# Patient Record
Sex: FEMALE | Race: WHITE | NOT HISPANIC OR LATINO | Employment: OTHER | URBAN - METROPOLITAN AREA
[De-identification: names, ages, dates, MRNs, and addresses within clinical notes are randomized per-mention and may not be internally consistent; named-entity substitution may affect disease eponyms.]

---

## 2018-07-27 ENCOUNTER — OFFICE VISIT - RIVER FALLS (OUTPATIENT)
Dept: FAMILY MEDICINE | Facility: CLINIC | Age: 63
End: 2018-07-27

## 2018-07-27 ASSESSMENT — MIFFLIN-ST. JEOR: SCORE: 1312.92

## 2018-07-28 LAB
CHOLEST SERPL-MCNC: 210 MG/DL
CHOLEST/HDLC SERPL: 3.3 {RATIO}
CREAT SERPL-MCNC: 0.7 MG/DL (ref 0.5–0.99)
GLUCOSE BLD-MCNC: 84 MG/DL (ref 65–99)
HDLC SERPL-MCNC: 64 MG/DL
LDLC SERPL CALC-MCNC: 114 MG/DL
NONHDLC SERPL-MCNC: 146 MG/DL
TRIGL SERPL-MCNC: 208 MG/DL

## 2018-08-01 ENCOUNTER — TRANSFERRED RECORDS (OUTPATIENT)
Dept: HEALTH INFORMATION MANAGEMENT | Facility: CLINIC | Age: 63
End: 2018-08-01

## 2018-08-01 LAB — HPV ABSTRACT: NORMAL

## 2018-08-16 ENCOUNTER — COMMUNICATION - RIVER FALLS (OUTPATIENT)
Dept: FAMILY MEDICINE | Facility: CLINIC | Age: 63
End: 2018-08-16

## 2018-08-17 ENCOUNTER — AMBULATORY - RIVER FALLS (OUTPATIENT)
Dept: FAMILY MEDICINE | Facility: CLINIC | Age: 63
End: 2018-08-17

## 2018-08-20 ENCOUNTER — AMBULATORY - RIVER FALLS (OUTPATIENT)
Dept: FAMILY MEDICINE | Facility: CLINIC | Age: 63
End: 2018-08-20

## 2018-08-27 ENCOUNTER — AMBULATORY - RIVER FALLS (OUTPATIENT)
Dept: FAMILY MEDICINE | Facility: CLINIC | Age: 63
End: 2018-08-27

## 2018-08-29 ENCOUNTER — AMBULATORY - RIVER FALLS (OUTPATIENT)
Dept: FAMILY MEDICINE | Facility: CLINIC | Age: 63
End: 2018-08-29

## 2018-09-17 ENCOUNTER — AMBULATORY - RIVER FALLS (OUTPATIENT)
Dept: FAMILY MEDICINE | Facility: CLINIC | Age: 63
End: 2018-09-17

## 2019-01-16 ENCOUNTER — OFFICE VISIT - RIVER FALLS (OUTPATIENT)
Dept: FAMILY MEDICINE | Facility: CLINIC | Age: 64
End: 2019-01-16

## 2019-04-11 ENCOUNTER — AMBULATORY - RIVER FALLS (OUTPATIENT)
Dept: FAMILY MEDICINE | Facility: CLINIC | Age: 64
End: 2019-04-11

## 2019-05-03 ENCOUNTER — COMMUNICATION - RIVER FALLS (OUTPATIENT)
Dept: FAMILY MEDICINE | Facility: CLINIC | Age: 64
End: 2019-05-03

## 2019-09-03 ENCOUNTER — COMMUNICATION - RIVER FALLS (OUTPATIENT)
Dept: FAMILY MEDICINE | Facility: CLINIC | Age: 64
End: 2019-09-03

## 2019-09-18 ENCOUNTER — AMBULATORY - RIVER FALLS (OUTPATIENT)
Dept: FAMILY MEDICINE | Facility: CLINIC | Age: 64
End: 2019-09-18

## 2019-12-13 ENCOUNTER — AMBULATORY - RIVER FALLS (OUTPATIENT)
Dept: FAMILY MEDICINE | Facility: CLINIC | Age: 64
End: 2019-12-13

## 2020-02-18 ENCOUNTER — AMBULATORY - RIVER FALLS (OUTPATIENT)
Dept: FAMILY MEDICINE | Facility: CLINIC | Age: 65
End: 2020-02-18

## 2020-05-08 ENCOUNTER — COMMUNICATION - RIVER FALLS (OUTPATIENT)
Dept: FAMILY MEDICINE | Facility: CLINIC | Age: 65
End: 2020-05-08

## 2020-09-28 ENCOUNTER — COMMUNICATION - RIVER FALLS (OUTPATIENT)
Dept: FAMILY MEDICINE | Facility: CLINIC | Age: 65
End: 2020-09-28

## 2020-10-29 ENCOUNTER — COMMUNICATION - RIVER FALLS (OUTPATIENT)
Dept: FAMILY MEDICINE | Facility: CLINIC | Age: 65
End: 2020-10-29

## 2020-12-13 ENCOUNTER — COMMUNICATION - RIVER FALLS (OUTPATIENT)
Dept: FAMILY MEDICINE | Facility: CLINIC | Age: 65
End: 2020-12-13

## 2021-02-05 ENCOUNTER — AMBULATORY - RIVER FALLS (OUTPATIENT)
Dept: FAMILY MEDICINE | Facility: CLINIC | Age: 66
End: 2021-02-05

## 2021-03-04 ENCOUNTER — COMMUNICATION - RIVER FALLS (OUTPATIENT)
Dept: FAMILY MEDICINE | Facility: CLINIC | Age: 66
End: 2021-03-04

## 2021-03-09 ENCOUNTER — OFFICE VISIT - RIVER FALLS (OUTPATIENT)
Dept: FAMILY MEDICINE | Facility: CLINIC | Age: 66
End: 2021-03-09

## 2021-03-09 ASSESSMENT — MIFFLIN-ST. JEOR: SCORE: 1361

## 2021-03-23 ENCOUNTER — COMMUNICATION - RIVER FALLS (OUTPATIENT)
Dept: FAMILY MEDICINE | Facility: CLINIC | Age: 66
End: 2021-03-23

## 2021-05-05 ENCOUNTER — COMMUNICATION - RIVER FALLS (OUTPATIENT)
Dept: FAMILY MEDICINE | Facility: CLINIC | Age: 66
End: 2021-05-05

## 2021-06-15 ENCOUNTER — RECORDS - HEALTHEAST (OUTPATIENT)
Dept: ADMINISTRATIVE | Facility: OTHER | Age: 66
End: 2021-06-15

## 2021-06-15 ENCOUNTER — RECORDS - HEALTHEAST (OUTPATIENT)
Dept: CARDIOLOGY | Facility: CLINIC | Age: 66
End: 2021-06-15

## 2021-06-16 ENCOUNTER — OFFICE VISIT - HEALTHEAST (OUTPATIENT)
Dept: CARDIOLOGY | Facility: TELEHEALTH | Age: 66
End: 2021-06-16

## 2021-06-16 ENCOUNTER — AMBULATORY - RIVER FALLS (OUTPATIENT)
Dept: FAMILY MEDICINE | Facility: CLINIC | Age: 66
End: 2021-06-16

## 2021-06-16 DIAGNOSIS — R00.0 TACHYCARDIA: ICD-10-CM

## 2021-06-17 ENCOUNTER — AMBULATORY - HEALTHEAST (OUTPATIENT)
Dept: CARDIOLOGY | Facility: CLINIC | Age: 66
End: 2021-06-17

## 2021-06-17 ENCOUNTER — RECORDS - HEALTHEAST (OUTPATIENT)
Dept: ADMINISTRATIVE | Facility: OTHER | Age: 66
End: 2021-06-17

## 2021-06-17 LAB
BUN SERPL-MCNC: 25 MG/DL (ref 7–25)
BUN/CREAT RATIO - HISTORICAL: NORMAL (ref 6–22)
CALCIUM SERPL-MCNC: 9.5 MG/DL (ref 8.6–10.4)
CHLORIDE BLD-SCNC: 109 MMOL/L (ref 98–110)
CO2 SERPL-SCNC: 23 MMOL/L (ref 20–32)
CREAT SERPL-MCNC: 0.8 MG/DL (ref 0.5–0.99)
EGFRCR SERPLBLD CKD-EPI 2021: 78 ML/MIN/1.73M2
GLUCOSE BLD-MCNC: 94 MG/DL (ref 65–99)
POTASSIUM BLD-SCNC: 4.2 MMOL/L (ref 3.5–5.3)
SODIUM SERPL-SCNC: 144 MMOL/L (ref 135–146)
TSH SERPL DL<=0.005 MIU/L-ACNC: 1.98 MIU/L (ref 0.4–4.5)

## 2021-06-18 ENCOUNTER — COMMUNICATION - HEALTHEAST (OUTPATIENT)
Dept: CARDIOLOGY | Facility: CLINIC | Age: 66
End: 2021-06-18

## 2021-06-26 NOTE — TELEPHONE ENCOUNTER
----- Message from Feliciano Bee MD sent at 6/17/2021  1:21 PM CDT -----  BMP and TSH normal  ----- Message -----  From: Irais Reyez  Sent: 6/17/2021  12:38 PM CDT  To: Feliciano Bee MD      === Message left for pt stating normal results and to call [phone] with any questions.  -[facility]

## 2021-07-04 NOTE — PROGRESS NOTES
Progress Notes by Feliciano Bee MD at 6/16/2021  3:30 PM     Author: Feliciano Bee MD Service: -- Author Type: Physician    Filed: 6/16/2021  4:29 PM Encounter Date: 6/16/2021 Status: Signed    : Feliciano Bee MD (Physician)             Assessment/Recommendations   Patient with at least 3 episodes of abrupt onset of tachycardia of around 160 bpm.  She does not get dramatic symptoms with this but certainly can feel her heart fluttering and on one occasion had to urinate frequently which suggest atrial dysrhythmia and release of atrial natruretic peptide.  My suspicion is she has supraventricular tachycardia or AV georgina reentry tachycardia.  Her twelve-lead EKG does not show evidence of an accessory pathway.  I cannot not exclude the possibility of atrial fibrillation which is paroxysmal but full duration is around 20 minutes and Valsalva seem to bring the rhythm disturbance on the last episode which should be inconsistent with atrial fibrillation and consistent with AV georgina reentry tachycardia.    I have recommended that we check a TSH, electrolytes today.  I recommend an echocardiogram to check the structural integrity of her heart.  We talked about the possibility of a calcium scan given her family history of coronary artery disease.  She will give that some thought.    If she gets further episodes she will try Valsalva if she is near a medical facility or working as a volunteer with the paramedic she will get a EKG rapidly so we can document the rhythm disturbance.  We talked about the possibility of a Lynx is implantable device which she can have her 2 or 3 years but the symptoms are not very dramatic and neither of us thought that that would be prudent at this point, particularly if she can break this with Valsalva consistently.    Thank you for allowing us to participate in her care.       History of Present Illness/Subjective    Ms. Danay Galdamez is a 65 y.o. female with no known heart  patient has had episodes of, about 3 or 4 in the last 2 years of abrupt onset of a fast heartbeat.  She can count the heart rate and wore a watch type monitor and her heart rate was up to 164 bpm and one episode and the monitor did not tell her what the rhythm was and said the heart rate was too fast.  She feels her heart going fast and on one occasion had to urinate frequently during this episode.  Episodes all of lasted about 20 minutes and then abruptly go back to normal as fast as they came on.  She does not get syncopal or near syncopal and does not get short of breath or chest discomfort with with these episodes.  She does not drink alcohol and cut way back on her caffeine although it does not does still drink some caffeine.  She denies any history of orthopnea, paroxysmal nocturnal dyspnea, peripheral edema syncope, near syncope and has no history rheumatic fever, cerebrovascular accident or TIA.  She has never had a heart murmur.  She has never smoked cigarettes, is not diabetic, has not been treated for hypertension and reports that her cholesterol is okay.  Her father had bypass surgery around age 68.  She has a brother who had A. fib and another brother who had bypass surgery.    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  She is  and has 2 children a son and a daughter.  She has a new grandchild and they met this new grandchild today.  She is .  Her  joined her for the visit today.        ECG: Personally reviewed.  EKG done on February 5, 2021 shows sinus rhythm at 61 bpm and is normal       Physical Examination Review of Systems   Vitals:    06/16/21 1527   BP: 118/74   Pulse: 72   SpO2: 97%     There is no height or weight on file to calculate BMI.  Wt Readings from Last 3 Encounters:   06/16/21 168 lb (76.2 kg)     General Appearance:   Alert, cooperative and in no acute distress.   ENT/Mouth: Patient wearing a mask.      EYES:  no scleral  icterus, normal conjunctivae   Neck: JVP normal. No Hepatojugular reflux. Thyroid not visualized.   Chest/Lungs:   Lungs are clear to auscultation, equal chest wall expansion.   Cardiovascular:   S1, S2 without murmur ,clicks or rubs. Brachial, radial and posterior tibial pulses are intact and symetric. No carotid bruits noted   Abdomen:  Nontender. BS+.    Extremities: No cyanosis, clubbing or edema   Skin: no xanthelasma, warm.    Neurologic: normal arm movement bilateral, no tremors     Psychiatric: Appropriate affect.      General: WNL  Eyes: WNL  Ears/Nose/Throat: WNL  Lungs: WNL  Heart: (Palpitations)  Stomach: WNL  Bladder: WNL  Muscle/Joints: WNL  Skin: WNL  Nervous System: WNL  Mental Health: WNL     Blood: WNL       Medical History  Surgical History Family History Social History   No past medical history on file. No past surgical history on file. No family history on file. Social History     Socioeconomic History   ? Marital status:      Spouse name: Not on file   ? Number of children: Not on file   ? Years of education: Not on file   ? Highest education level: Not on file   Occupational History   ? Not on file   Social Needs   ? Financial resource strain: Not on file   ? Food insecurity     Worry: Not on file     Inability: Not on file   ? Transportation needs     Medical: Not on file     Non-medical: Not on file   Tobacco Use   ? Smoking status: Never Smoker   ? Smokeless tobacco: Never Used   Substance and Sexual Activity   ? Alcohol use: Not on file   ? Drug use: Not on file   ? Sexual activity: Not on file   Lifestyle   ? Physical activity     Days per week: Not on file     Minutes per session: Not on file   ? Stress: Not on file   Relationships   ? Social connections     Talks on phone: Not on file     Gets together: Not on file     Attends Worship service: Not on file     Active member of club or organization: Not on file     Attends meetings of clubs or organizations: Not on file      Relationship status: Not on file   ? Intimate partner violence     Fear of current or ex partner: Not on file     Emotionally abused: Not on file     Physically abused: Not on file     Forced sexual activity: Not on file   Other Topics Concern   ? Not on file   Social History Narrative   ? Not on file          Medications  Allergies   No current outpatient medications on file.     No current facility-administered medications for this visit.     No Known Allergies      Lab Results    Chemistry/lipid CBC Cardiac Enzymes/BNP/TSH/INR   No results found for: CHOL, HDL, LDLCALC, TRIG, CREATININE, BUN, K, NA, CL, CO2 No results found for: WBC, HGB, HCT, MCV, PLT No results found for: CKTOTAL, CKMB, CKMBINDEX, TROPONINI, BNP, TSH, INR

## 2021-07-05 PROBLEM — R00.0 TACHYCARDIA: Status: ACTIVE | Noted: 2021-06-16

## 2021-07-06 VITALS
OXYGEN SATURATION: 97 % | SYSTOLIC BLOOD PRESSURE: 118 MMHG | HEART RATE: 72 BPM | DIASTOLIC BLOOD PRESSURE: 74 MMHG | WEIGHT: 168 LBS

## 2021-07-07 ENCOUNTER — COMMUNICATION - RIVER FALLS (OUTPATIENT)
Dept: FAMILY MEDICINE | Facility: CLINIC | Age: 66
End: 2021-07-07

## 2021-09-07 ENCOUNTER — OFFICE VISIT - RIVER FALLS (OUTPATIENT)
Dept: FAMILY MEDICINE | Facility: CLINIC | Age: 66
End: 2021-09-07

## 2021-09-07 ASSESSMENT — MIFFLIN-ST. JEOR: SCORE: 1361

## 2022-02-11 VITALS
HEIGHT: 67 IN | HEART RATE: 78 BPM | SYSTOLIC BLOOD PRESSURE: 112 MMHG | TEMPERATURE: 97 F | WEIGHT: 161 LBS | BODY MASS INDEX: 25.27 KG/M2 | DIASTOLIC BLOOD PRESSURE: 68 MMHG

## 2022-02-11 VITALS
WEIGHT: 171.6 LBS | SYSTOLIC BLOOD PRESSURE: 116 MMHG | HEIGHT: 67 IN | HEART RATE: 60 BPM | TEMPERATURE: 97.6 F | DIASTOLIC BLOOD PRESSURE: 66 MMHG | BODY MASS INDEX: 26.93 KG/M2

## 2022-02-11 VITALS
WEIGHT: 171.6 LBS | HEIGHT: 67 IN | HEART RATE: 73 BPM | BODY MASS INDEX: 26.93 KG/M2 | DIASTOLIC BLOOD PRESSURE: 82 MMHG | SYSTOLIC BLOOD PRESSURE: 122 MMHG

## 2022-02-16 NOTE — TELEPHONE ENCOUNTER
Order, notes and insurance card are faxed to [facility] and they will contact patient and schedule.

## 2022-02-16 NOTE — PROGRESS NOTES
Patient:   TESHA SHANE            MRN: [id number]            FIN: [id number]               Age:   65 years     Sex:  Female     :  1955   Associated Diagnoses:   None   Author:   Ger WALDRON, Wilman      Procedure   EKG procedure   Patient sent in incomplete EKG from outside provider.  EKG read as normal.

## 2022-02-16 NOTE — TELEPHONE ENCOUNTER
---------------------  From: Ary Gaytan RN (Phone Messages Pool ([id number]_[place]))   To: Appointment Pool ([id number]_[place]);     Sent: 3/4/2021 6:18:31 PM CST  Subject: Consult     Please call the patient and schedule a consult for colonoscopy change in bowel per CHT.scheduled

## 2022-02-16 NOTE — TELEPHONE ENCOUNTER
---------------------  From: Charu Goldsmith CMA (Phone Messages Pool ([id number]_[place]))   To: Wilman Cyr MD;     Sent: 1/29/2019 10:33:55 AM CST  Subject: FW: to Dr. Cyr           ---------------------  From: DANAY SHANE  To: [facility] [place]  Sent: 01/29/2019 09:53 a.m. CST  Subject: to Stefan Nevarez, thanks very much.  I m happy to have normal bone density for a young person at age 63--as you can imagine.  I appreciate your willingness to vouch for me as a reference and that you have kim that I can be useful to [facility], whether [place] or .  Or, I guess, [place], if nothing else pans out.    Anyway, thanks very much.  Danay

## 2022-02-16 NOTE — TELEPHONE ENCOUNTER
---------------------  From: Kim Treviño CMA (Phone Messages Pool ([id number]_[facility]))   To: Wilman Cyr MD;     Sent: 7/7/2021 8:12:18 AM CDT  Subject: FW: additional to Júnior Cyr           ---------------------  From: DANAY SHANE  To: [facility]  Sent: 07/06/2021 07:18 p.m. CDT  Subject: additional to Júnior Cyr  We are only down here in town, with internet and cell coverage, till tomorrow, so a lema response would be so appreciated.  My phone number is [phone] if you would want to call.  Danay

## 2022-02-16 NOTE — TELEPHONE ENCOUNTER
---------------------  From: Vickie hWitmore CMA (Phone Messages Pool ([id number]_[facility]))   To: [facility] Message Pool ([id number]_[facility]);     Sent: 3/23/2021 3:28:24 PM CDT  Subject: FW: Stefan Cyr--fit test anemia           ---------------------  From: TESHA SHANE  To: [facility]  Sent: 03/23/2021 03:15 p.m. CDT  Subject: Stefan Cyr--fit test anemia  Stefan Delong, thanks for your lema reply.  Do I just go in and ask for a test for anemia?  Not a bad idea.  I used to give blood, before they cut me off due to my having been in [place] during [place] times, and I was sometimes sent home because I had anemia.    Thanks, M---------------------  From: Alia Randolph CMA ([facility] Message Pool ([id number]_[facility]))   To: Phone Messages Pool ([id number]_[facility]); TESHA SHANE    Sent: 3/23/2021 3:52:10 PM CDT  Subject: RE: Stefan Cyr--fit test anemia     Catherine Jon,    An order for the Hemoglobin has been placed, so all you need to do is request a lab only appointment through your portal or call [phone] to schedule.     Warm regards,  Alia WIGGINS CMA

## 2022-02-16 NOTE — TELEPHONE ENCOUNTER
---------------------  From: Sugey William RN (Phone Messages Pool ([id number]_[facility]))   To: [facility] Message Pool ([id number]_[facility]);     Sent: 9/28/2020 4:56:38 PM CDT  Subject: FW: heart rhythm           ---------------------  From: TESHA SHANE  To: [facility]  Sent: 09/27/2020 01:23 p.m. CDT  Subject: heart rhythm  Dear Stefan Cyr,  I  hope you and family are well.  Sorry to bother you with this, but here goes:  A year ago, and I may have written you about this, [place].  I ate a wild plum, as I am wont to do and have for decades, and then, later, down at the house, I experienced a racing heartbeat.  I took a Benadryl tablet and we started to drive to [facility], but by [place] I was fine and we came home.  End of story.    Now I am in [place].  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  In APril Tru and I will move out there and live till October.  The nearest town is [place], two hours away on rough, [place], curvy [place] roads.    Tru and I were invited to spend a few days there to experience what it is going to be like, so we drove out and were there for a few days.  I took a hike down to the river and back.  It is a steep climb up.  I was talking with the current writer in residence and doing fine, we were going slowly, and then I felt something like a fish flop in my chest.  I went up to the cabin and sat down and felt my heart racing.  I couldn t make it slow down.    I went out to the car and reclined the seat and tried deep coughs, straining as if for a bowel movement, etc.  My throat felt constricted and my neck muscles were sore, but that could be from my backpack I had been wearing.  The throat thing ended fairly soon, but I could not get the racing heart beat down below 134, which is twice what it normally would be after a long hike and then a rest.    Eventually, after about  an hour or so, it slowed to a regular beat.    I thought about the causes and here is what I think:  I was not drinking much at all.  I  normally get a good bit of exercise walking to the bathroom at night to pee, but I was only having to get up once the nights I was there.  I just don t drink a lot of liquids, and I was drinking less than usual while there.  Also, I had two mugs of high-powered coffee in the morning.  It was delicious, but I normally have a couple of mugs of a less-amped coffee, half de-caf and half regular, in the morning.    Once we were back in range of cell service (there is no phone or wifi at this place) I looked up dehydration and heart rhythm and read that being dehydrated can lead to low blood volume and thus the heart has to race to keep things up, as in hypovolemic shock.    I can t remember if I was dehydrated for the first incident, the [place] Incident, as I shall call it.  But I know I was for this one.    Tru is understandably concerned about our being so far from services and me with unanswered questions about my heart.  I don t know how we can find out more, if I m only having these incidents every 15 months or so.  But if you have ideas, I am all ears.    [place] on the [place].  You and Chacha are welcome to visit, if you wish--there are two cabins, both with indoor plumbing.  No phones or internet, and it is a drive of a few miles to get to within striking distance of possible cell coverage.    Very best wishDanay owen---------------------  From: Alia Amaro ([employer] Message Pool ([id number]_[facility]))   To: Wilman Cyr MD;     Sent: 9/28/2020 5:09:40 PM CDT  Subject: FW: heart rhythm---------------------  From: Wilman Cyr MD   To: [employer] Message Pool ([id number]_[facility]); DANAY SHANE    Sent: 9/29/2020 9:41:35 AM CDT  Subject: RE: heart rhythm     Baljeet Jon,    Congratulations on your writing residency.    Dehydration is a  likely cause of your rapid heart rate.  There are two possibilities here.  If the rapid rate is irregular then there is concern for atrial fibrillation which needs to be treated.  The biggest risk is a stroke when you go into and out of atrial fibrillation.    The other fast heart rate would be regular and most likely related to low volume treated with drinking with more fluids.     We need to get a tracing during these episodes.  Some people can do it on their watches or another option would be to go to an emergency room during an episode and have them run an EKG.      A final option is to see a cardiologist and they can implant a recording device under the skin that monitors your heart beat for 2 weeks or more.    If you would like to see a cardiologist I can set it up when you are home.  We need to diagnose the actual rhythm before we can look at treatment.    Hope this helps.    Stefan Cyr MD

## 2022-02-16 NOTE — TELEPHONE ENCOUNTER
---------------------  From: Kim Treviño CMA (Phone Messages Pool ([id number]_[facility]))   To: TESHA GALDAMEZ    Sent: 10/29/2020 8:14:23 AM CDT  Subject: RE: testing for Covid     Tesha    The next step would be to call the clinic [phone] and set up a video/telephone visit with a provider to discuss and they can order the COVID test if decided.    Please let us know if you have further questions.  NIMA Washburn      ---------------------  From: TESHA GALDAMEZ  To: [facility]  Sent: 10/28/2020 07:47 p.m. CDT  Subject: testing for Covid  HI,  Our daughter is home from [place].  She doesn t think she has been in contact with anyone who has Covid, but she is experiencing a runny nose, headache, and slight sore throat.  Should she get a Covid test?  She will be leaving for [place] in a week.    And if she gets one, I feel as if I should get one, as well, because I ve been around her so much.    What is the next step?  THanks, Tesha Galdamez

## 2022-02-16 NOTE — TELEPHONE ENCOUNTER
---------------------  From: Charu Goldsmith CMA (Phone Messages Pool ([id number]_[place]))   To: Wilman Cyr MD;     Sent: 9/3/2019 2:29:38 PM CDT  Subject: FW: Danayjohn Galdamez to Stefan Cyr           ---------------------  From: DANAY GALDAMEZ  To: [facility]  Sent: 09/03/2019 02:23 p.m. CDT  Subject: Danay Galdamez to Stefan Cyr  Hi Stefan, I very nearly came to see you today.  I was out walking with a friend and picked a ripe plum off a bush and ate it, spitting out the stone and the skin.  We got back to the house and he left.  I went in and felt as if my heart were racing.  I got out my stethoscope and was astonished to find it beating at about 140+ bpm.  I could not get a BP on me probably because I am so inept.  My throat began to feel as it does when one is trying not to cry--sort of sore and close.  The friend with whom I d been walking is a paramedic for [place], [employer], so I texted him to ask his advice.  He suggested an allergic reaction to the plum and thought maybe taking a Benadryl would help.  I tried resting, but  the rate would not decrease, and I was feeling not good.    I took a Benadryl and R started driving me to [place].  By the time we got to [place], I was feeling much, much better.  I got out of the truck and did some in-place running to see what it would do to my heart.  All seemed fine, so we came home.    I m relating all this to you in case you have some ideas as to what could have happened.  I ve certainly had a lot of plums through the years.  It s hard to imagine that could have been it.  I m training for the [employer] on Oct 6 and will be running 20 miles on Thursday as part of the training.    I hope all is well with you.  Thanks, Danay---------------------  From: Ger WALDRON, Matthews   To: Phone Messages Pool ([id number]_[place]); DANAY GALDAMEZ    Sent: 9/3/2019 4:13:42 PM CDT  Subject: RE: Danay Galdamez to Stefan  Ger Jon,    I think it was an allergy.  It's possible it's something you inhaled or the plum.  I would take a loratadine (generic Claritin)  10mg daily for the next couple days.  It will last longer than the Benadryl and have fewer side effects.  It is available over the counter.    Good luck with the [place]!    Stefan Cyr MD

## 2022-02-16 NOTE — TELEPHONE ENCOUNTER
Order faxed to [facility], they will contact patient to schedule. LM for patient to call me for update.

## 2022-02-16 NOTE — TELEPHONE ENCOUNTER
---------------------  From: Alia Randolph CMA ([employer] Message Pool ([id number]_[facility]))   To: Wilman Cyr MD;     Sent: 5/6/2021 7:51:04 AM CDT  Subject: FW: heart question           ---------------------  From: Jackson/Eliana BEAVER (Phone Messages Pool ([id number]_[facility]))   To: [facility] Message Pool ([id number]_[facility]);     Sent: 5/5/2021 1:07:21 PM CDT  Subject: FW: heart question           ---------------------  From: TEHSA SHANE  To: [facility]  Sent: 05/05/2021 12:46 p.m. CDT  Subject: heart question  Stefan KING,  You might remember that I have had a couple of heart-racing episodes.  I wrote to you earlier about it, I went in and had an EKG that was fine, and we decided the situation must have happened because I d let myself get dehydrated.  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  Today I went for a hike by myself (usually [place]) and after a tramp on the [place] I climbed down a steep slope and sat by the beautiful river really, this is such a beautiful place!!!  I had a granola bar and some water and then decided to climb back up.  I first had to get off the rock I was on, and I did that, and suddenly I felt my heart turn over and go into overdrive.  I have one of those smart watches and it does an EKG.  I sat down and tried to get it to work.  But the results were inconclusive because, it said, my heart rate was 164 and too fast for it to analyze.  I sat and tried to quiet it.  I finished off my water in the bottle, in case it was dehydration causing the problem.  I tried another EKG with the same results.  I could not get that heart to settle.  Crazy, because I have been getting great exercise here.  Every day I hike down to the river, which is 800 vertical feet from the cabin.  There s a trail of switchbacks that goes down to it, taking   mile to  get down to the river.  I go down, savor the river, and then hike back up and I really work myself, arriving at the top with a heart rate of usually 140, which then settles back to under 100 easily within a short time.  My resting heart rate is 55.  Most days I log about 20,000 steps by end of day.  So, back to the racing heart.  I did that maneuver in which you squeeze hard, as if you are giving birth, and I held that HARD for a while, and then I finally relaxed and took in some air, I was fine.  Heart rate down to less than 100.  I tried the EKG and it said normal sinus rhythm.  I then walked home slowly, taking my time.  I had to climb back up to the cabin, which I did slowly, and I have been fine ever since.  But both Tru and I are concerned and wonder what to make of this.  I don t think my heart itself is the problem.  One of my brothers had to have an ablation; maybe I need that?  I really don t know.  I m composing this in the evening in the cabin.  We ll go to town the day after tomorrow and I ll paste it into the email part of the [employer] web site and send it as soon as we can get connected.  Then we ll be in town for a few hours then we will go back to isolation.  We do have a radio phone here, and if you feel you need to reach me, the number is [phone].  You have to dial the 1 first.  It takes a few rings before we get a ring here in the cabin, so give it time.  BUT that kind of urgency is probably not necessary.  We will be back in coverage on May 11, so I could get your response then.  We will be coming back in June, I believe, to see Lucien s new baby.  It is due on the 27th of May.  We won t rush back for the earliest days of its life (though of course I want to).  If I could get an appointment with someone who is a heart doctor, if you think that is houser, I could see that person then.  I know it is tough to get an appt with a specialist, so I am prepared for that to be maybe not  possible.    Tesha Galdamez---------------------  From: Wilman Cyr MD   To: [facility] Message Pool ([id number]_[facility][note 2]); TESHA GALDAMEZ    Sent: 5/6/2021 8:46:48 AM CDT  Subject: RE: heart question     Baljeet Jon,    This most likely is PSVT which is not Afib.  The trick is to get an EKG while it is happening.  One cannot rule out V Tach which is unstable and an emergency without an EKG during the episode.  I will work on getting you a cardiology referral.    If it happens again I would try to go in.    Stefan Cyr MD  ** Submitted: **  Order:Referral (Request)  Details:  5/6/2021 8:47 AM CDT, Referred to: Cardiology, Referred to: [facility], Reason for referral: Please see message on when she will be in town.  Probable diagnosis PSVT., Tachycardia         Signed by Wilman Cyr MD  5/6/2021 1:47:00 PM [facility]

## 2022-02-16 NOTE — LETTER
(Inserted Image. Unable to display)   July 29, 2019      TESHA SHANE   [address] [place], [place] [id number]        Dear TESHA,      Thank you for selecting [facility] ([facility]viously [facility]) for your healthcare needs.     Our records indicate you are due for the following services:     Annual Physical    To schedule an appointment or if you have further questions, please contact your primary clinic:   [facility]          [phone]   [facility]    [phone]             [facility]         [phone]      Powered by [employer]    Sincerely,    Wilman Cyr M.D.

## 2022-02-16 NOTE — PROGRESS NOTES
Patient:   TESHA SHANE            MRN: [id number]            FIN: [id number]               Age:   65 years     Sex:  Female     :  1955   Associated Diagnoses:   None   Author:   Wilman Cyr MD      Procedure   EKG procedure   Date:  2021.     Confirmed: patient.     Performed by: nurse.     Indication: palpitations.     EKG findings   Interpretation: Wilman yCr MD.     Rhythm: heart rate  61  beats/min.     Axis: normal axis, normal configuration.     P waves: normal.     QRS complex: normal.     ST-T-U complex: normal, isoelectric ST segment, prolonged QT interval.     Interpretation: normal EKG.

## 2022-02-16 NOTE — PROGRESS NOTES
Patient:   TESHA SHANE            MRN: [id number]            FIN: [id number]               Age:   65 years     Sex:  Female     :  1955   Associated Diagnoses:   Positive FIT (fecal immunochemical test)   Author:   Justo Martínez MD      Visit Information      Date of Service: 2021 02:32 pm  Performing Location: [facility]  Encounter#: [id number]      Primary Care Provider (PCP):  Wilman Cyr MD    NPI# [id number]      Referring Provider:  Justo Martínez MD    NPI# [id number]      Chief Complaint   3/9/2021 2:49 PM CST     Colonoscopy consult        History of Present Illness   [place] in [place] 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    Positive FIT .  Vegetarian since college. Has always had good bowel movement in the morning. 2020 started having loose and more frequent stools. Did not feel as good as normal. Had a little intermittent discomfort in the left upper abdomen. Decided to give up coffee one week ago and all symptoms improved in two days. Had been drinking 4 cups a day. Denies blood in stools.    No alcohol. Nonsmoker. No soda. Ibuprofen seldom for headache.      Review of Systems   Constitutional:  No fever.    Respiratory:  No shortness of breath.    Cardiovascular:  No chest pain.    Gastrointestinal:  No vomiting, No diarrhea, No constipation.    Hematology/Lymphatics:  No bruising tendency, No bleeding tendency.    All other systems reviewed and negative     No history of bleeding disorders or blood transfusion  No prior problems with anesthesia  No family history of anesthesia problems  No positive responses for sleep apnea  Denies plavix, coumadin  Denies history of DVT/PE  Denies recent corticosteroid use    Able to walk a flight of stairs without chest pain or shortness of breath.      Health Status   Allergies:    Allergic Reactions (Selected)  No known allergies   Medications:  (Selected)      Problem list:    All  Problems  Resolved: Childbirth / SNOMED CT 9088953112  Resolved: Pregnancy / SNOMED CT 219224155  Resolved: Pregnancy / SNOMED CT 120153819      Histories   Procedure history:    Tubal ligation (SNOMED CT 284488755) on 1990 at 34 Years.   Social History:  (Tru Savage). 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 Has two children.  Family History: Mom  80 from Rheumatoid arthritis. Dad  75 from Liver cancer. One sister and five brothers (one with Lymphoma)      Physical Examination   Vital Signs   3/9/2021 2:49 PM CST Temperature Tympanic 97.6 DegF  LOW    Peripheral Pulse Rate 60 bpm    Pulse Site Radial artery    HR Method Manual    Systolic Blood Pressure 116 mmHg    Diastolic Blood Pressure 66 mmHg    Mean Arterial Pressure 83 mmHg    BP Site Right arm    BP Method Manual      Measurements from flowsheet : Measurements   3/9/2021 2:49 PM CST Height Measured - Standard 67 in    Weight Measured - Standard 171.6 lb    BSA 1.92 m2    Body Mass Index 26.87 kg/m2  HI      General:  Alert and oriented, No acute distress.    HENT:  No pharyngeal erythema.    Neck:  No lymphadenopathy.    Respiratory:  Lungs are clear to auscultation.    Cardiovascular:  Normal rate, Regular rhythm.    Gastrointestinal:  Soft, Non-tender, Non-distended.    Musculoskeletal:  Normal gait.    Neurologic:  No focal deficits.    Psychiatric:  Appropriate mood & affect.       Impression and Plan   Diagnosis     Positive FIT (fecal immunochemical test) (DBN11-HX R19.5).     Discussed risks and benefits of colonoscopy.

## 2022-02-16 NOTE — TELEPHONE ENCOUNTER
---------------------  From: Kelsi Villar MA (Phone Messages Pool ([id number]_[facility]))   To: Wilman Cyr MD;     Sent: 1/28/2019 10:12:15 AM CST  Subject: CONSUEMER MESSAGE: For Dr. Cyr           ---------------------  From: DANAY SHANE  To: [facility]  Sent: 01/28/2019 10:07 a.m. CST  Subject: For Dr. 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  May I use you as a reference?  If so, I need an email and phone number at which they can reach you.    1.  Please let me know if you are ok with being my reference.    2.  If you are ok with that, please send me the email and phone number you want them to have.    Thanks very much!  Danay---------------------  From: Wilman Cyr MD   To: Phone Messages Pool ([id number]_[facility]); DANAY SHANE    Sent: 1/29/2019 8:06:58 AM CST  Subject: RE: CONSUEMER MESSAGE: For Dr. Ger Jon,    I would be delighted.  Please have them use [phone] [facility]) and [email or web address].    Good luck, they will be luck to have you.    Stefan

## 2022-02-16 NOTE — NURSING NOTE
Phone Message    PCP:   [facility]      Time of Call:  1:40 pm    Phone number:  n/a    Returned call at: n/a    Note:   Pt called stating that she went for a walk and ate a plum about a 1/2 hour ago. Since then she has had an increased heart rate and a weird feeling in her throat. Wondering if there was an allergen on the plum that didn't agree with her as she has had plums before. States that she is SOB but no chest pain. Was talking clearly and normal. States that her heart rate went to 140 bpm and has come down since then. Declines wanting to go to [facility] or ER. Ok to drive living 15 minutes away. Apt made with CHT and advised to come now. Verbally told CHT and CSS that patient was coming.    Pharmacy: n/a    Last office visit and reason: 6/2018    Transferred to: mar/José Miguel Call  Time: 2:03 pm  Note:  Pt called back stating that she took a benadryl prior to leaving her home and after just a few minutes of driving here she started to feel much better. States that her heart rate is lower and her throat feels normal. They decided to turn around and go home. Will continue to monitor at home and call if needed.

## 2022-02-16 NOTE — TELEPHONE ENCOUNTER
---------------------  From: Vickie Whitmore CMA (Phone Messages Pool ([id number]_[facility]))   To: [facility] Message Pool ([id number]_[facility]);     Sent: 3/23/2021 2:43:28 PM CDT  Subject: FW: Stefan Cyr--fit test           ---------------------  From: TESHA SHANE  To: [facility]  Sent: 03/23/2021 02:25 p.m. CDT  Subject: Stefan Cyr--fit test  HI, Stefan,  In August of 2018 I had a positive fit test.  Not to be too descriptive, but I do remember that morning--I had let the kit sit around for a while and thought, hey, you have to get this done.  So I set it up and had a big bowel movement, larger than normal.  Not hard, just big.  I thought the little sling to catch it would slide off, but I got what I needed and sent it in.  It was surprising to me that it came back positive, but ... that WAS a lot of poop coming through.    I decided not to have a colonoscopy and just forgot about it until this winter, when I started feeling uncomfortable on the upper left side of my abdomen and had a bit of a change in bowel habits--they are more frequent now, but a bit smaller than before.    I made an appointment to see Dr. Martínez re a colonoscopy.  That very day I gave up coffee, which I had been enjoying every day to the tune of four cups.    Within two days I felt fine.  Now I have gone back to coffee, but only on Sundays.  No adverse reactions to that.  No pain, no discomfort, no dark stools, no problems.    Dr. Martínez was really nice but seemed to think I still needed the colonoscopy.  I pointed out that because it was no longer a screening colonoscopy, due to the fit test result, it would not be covered by my insurance.  He was sympathetic to that--I know you doctors can t control the billing and pricing of things--but still thought I should have it.    I just don t see the point.  I m healthy, my innards are functioning perfectly, I feel good.  It has been two-and-a-half YEARS since that positive  fit test.  It seems as if we should start from the beginning again next time I come in to see you.    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  When I come back, how about then?  It will be more than 3 years since the first test and surely there has to be a re-set button on that.    I know you have to  caution, caution, caution.  I understand that.  But in this case, I really hope we can factor into the situation my general good health and lack of family history with colon cancer.  I have a huge family, too.  Six siblings and a myriad of cousins and aunts and uncles.  No colon cancer.  We die from heart disease, that s kind of our specialty.    Well, I look forward to your advice.  Thanks very much!  Danay---------------------  From: Alia Randolph CMA ([employer] Message Pool ([id number]_[facility]))   To: Wilman Cyr MD;     Sent: 3/23/2021 2:50:49 PM CDT  Subject: FW: Stefan Cyr--fit test---------------------  From: Wilman Cyr MD   To: [facility] Message Pool ([id number]_[facility]); DANAY SHANE    Sent: 3/23/2021 3:05:19 PM CDT  Subject: RE: Stefan Cyr--fit test     Baljeet Jon,    The answer is pretty straight forward.  The unequivocal recommendation is to proceed with the colonoscopy.  I understand the concerns with insurance and I disagree that the follow up colonoscopy is anything but screening.  The insurance companies don't care and since they make the rules you can guess that they will not cover it.  We can at least check a hemoglobin and make sure you aren't anemic  By September you will be 1 year away from medicare so that might be an option too.  Congratulations on the writing residency.    Stefan Cyr MD

## 2022-02-16 NOTE — LETTER
(Inserted Image. Unable to display)   February 13, 2020      TESHA SHANE   [address] [place], [place] [id number]        Dear TESHA,      Thank you for selecting [facility] ([facility]viously [facility]) for your healthcare needs.     Our records indicate you are due for the following services:     Immunization update    To schedule an appointment or if you have further questions, please contact your primary clinic:   [facility]          [phone]   [facility]    [phone]             [facility]         [phone]      Powered by [employer]    Sincerely,    Wilman Cyr M.D.

## 2022-02-16 NOTE — TELEPHONE ENCOUNTER
---------------------  From: Ary Gaytan RN   To: [email or web address] Message Pool ([id number]_[facility]);     Sent: 3/4/2021 6:16:15 PM CST  Subject: change bowel habit FYI     Time of Call:  1810     Person Calling:  patient  Phone number:  [phone]    Note:   Patient has had a change in bowel habits for several months. She feels she needs a colonoscopy. I let her know she should schedule a consult with one of our colonoscopist Dr. Perkins or Dr. Martínez. She agrees to do so. I let her know I would have the scheduling department call her on 3/5/21.---------------------  From: Alia Randolph CMA ([email or web address] Message Pool ([id number]_[facility]))   To: Appointment Pool ([address]_[place]);     Sent: 3/5/2021 8:34:56 AM CST  Subject: FW: change bowel habit FYIscheduled

## 2022-02-16 NOTE — PROGRESS NOTES
"Chief Complaint    c/o left knee injury over the summer--overuse.  heard \"pop\" while doing pushup a while back.  denies swelling but did have calf/foot swelling after injury.  History of Present Illness       Patient is here to discuss left knee pain that occurred over the summer.  She was in [place] most of the summer doing a lot more walking and hiking than usual.  She noted stiffness of the left knee.  She reports doing some push-ups when she felt a pop in the posterior aspect of the knee.  She had this evaluated in clinic and ultrasound was done.  Presumptive problems with a Baker's cyst are diagnosed.  She rested the leg and the knee started to improve.  Currently she has minimal trouble with the knee.  She has occasional tightness.  There is no instability or significant pain with walking.  She is starting to resume more regular activity.  No other imaging has been done.  Review of Systems       See HPI.  All other review of systems negative.  Physical Exam   Vitals & Measurements    HR: 73 (Peripheral)  BP: 122/82     HT: 67 in  WT: 171.6 lb  BMI: 26.87        Alert, oriented, no acute distress       Normal heart       Nonlabored breathing       Exam of the knee reveals no obvious abnormality.  She has no joint line tenderness no patellar apprehension the knee is stable, Iris's is negative       Range of motion of both knees and hips is normal       Strength is normal  Assessment/Plan       1. Left knee pain (M25.562)          Left knee pain of unclear etiology although currently symptoms have almost resolved.  We have discussed the minimal benefits of imaging at this time since she is improving.  She will continue to monitor the knee and start resuming activity.  If symptoms recur consider imaging at that point.  Patient Information     Name:TESHA SHANE      Address:      [address] [place] [id number]     Sex:Female     YOB: 1955     [phone]     Emergency " Contact:TRU SAVAGE     MRN:[id number]     FIN:[id number]     Location:[facility]     Date of Service:09/07/2021      Primary Care Physician:       Wilman Cyr MD, [phone]      Attending Physician:       Justo Jefferson MD, [phone]  Problem List/Past Medical History    Ongoing     No qualifying data    Historical     Childbirth       Comments: X 2     Pregnancy     Pregnancy  Procedure/Surgical History     Tubal ligation (05/06/1990)  Medications   No active medications  Allergies    No known allergies  Social History    Smoking Status     Never smoker     Alcohol - Denies Alcohol Use      Never     Electronic Cigarette/Vaping      Electronic Cigarette Use: Never.     Exercise - Regular exercise      Exercise frequency: 5-6 times/week. Exercise type: Bicycling, Walking.     Home/Environment      Spouse/Partner name: Tru Savage.     Nutrition/Health      Type of diet: Regular.     Other      First menses age 15. No history of abnormal Pap smear.     Sexual      Sexually active: Yes. Sexual orientation: Straight or heterosexual.     Substance Abuse - Denies Substance Abuse      Never     Tobacco - Denies Tobacco Use      Never (less than 100 in lifetime)  Family History    CA - Cancer: Father.    Heart disease: Grandfather (M), Grandfather (P), Grandmother (M) and Grandmother (P).    Lymphoma: Brother.    Osteoporosis: Mother.    Rheumatoid arthritis: Mother.  Lab Results          Lab Results (Last 4 results within 90 days)           Sodium Level: 144 mmol/L [135 mmol/L - 146 mmol/L] (06/16/21 16:31:00)          Potassium Level: 4.2 mmol/L [3.5 mmol/L - 5.3 mmol/L] (06/16/21 16:31:00)          Chloride Level: 109 mmol/L [98 mmol/L - 110 mmol/L] (06/16/21 16:31:00)          CO2 Level: 23 mmol/L [20 mmol/L - 32 mmol/L] (06/16/21 16:31:00)          Glucose Level: 94 mg/dL [65 mg/dL - 99 mg/dL] (06/16/21 16:31:00)          BUN: 25 mg/dL [7 mg/dL - 25 mg/dL] (06/16/21 16:31:00)           Creatinine Level: 0.8 mg/dL [0.5 mg/dL - 0.99 mg/dL] (06/16/21 16:31:00)          BUN/Creat Ratio: NOT APPLICABLE [6  - 22] (06/16/21 16:31:00)          eGFR: 78 mL/min/1.73m2 (06/16/21 16:31:00)          eGFR African American: 91 mL/min/1.73m2 (06/16/21 16:31:00)          Calcium Level: 9.5 mg/dL [8.6 mg/dL - 10.4 mg/dL] (06/16/21 16:31:00)          TSH: 1.98 mIU/L [0.4 mIU/L - 4.5 mIU/L] (06/16/21 16:31:00)  Immunizations          Scheduled Immunizations          Dose Date(s)          IPV          06/23/2000          Other Immunizations          Hep A          06/23/2000, 06/14/2001          MMR (measles/mumps/rubella)          08/27/2018          influenza virus vaccine, inactivated          09/17/2018, 09/18/2019          tetanus/diphth/pertuss (Tdap) adult/adol          03/31/2015          hepatitis B adult vaccine          08/17/2018, 09/17/2018          zoster vaccine, inactivated          12/13/2019, 02/18/2020

## 2022-02-16 NOTE — PROCEDURES
Accession Number:       [id number]  CLINICAL INFORMATION::     7 or more years since last Pap  LMP::     POSTMENOPAUSAL  PREV. PAP::     5/27/2010  PREV. BX::     NONE GIVEN  SOURCE::     Cervix, Endocervix  STATEMENT OF ADEQUACY::     Satisfactory for evaluation. Endocervical/transformation zone component present.  INTERPRETATION/RESULT::     Negative for intraepithelial lesion or malignancy. Atrophic pattern; predominantly parabasal cells  COMMENT::     This Pap test has been evaluated with computer assisted technology.  CYTOTECHNOLOGIST::     STARR DOTY(ASCP) CT Screening location: [place] [address]  REVIEW CYTOTECHNOLOGIST::     STARR BURNS(ASCP) CT Screening location: [place] [address]  COMMENT:     See comment       EXPLANATORY NOTE:         The Pap is a screening test for cervical cancer. It is       not a diagnostic test and is subject to false negative       and false positive results. It is most reliable when a       satisfactory sample, regularly obtained, is submitted       with relevant clinical findings and history, and when       the Pap result is evaluated along with historic and       current clinical information.  HPV mRNA E6/E7:     Not Detected       This test was performed using the APTIMA HPV Assay ([employer]       This assay detects E6/E7 viral messenger RNA (mRNA) from 14       high-risk HPV types (16,18,31,33,35,39,45,51,52,56,58,59,66,68).         The analytical performance characteristics of       this assay have been determined by [employer]. The modifications have not been       cleared or approved by the FDA. This assay has       been validated pursuant to the CLIA regulations       and is used for clinical purposes.

## 2022-02-16 NOTE — TELEPHONE ENCOUNTER
---------------------  From: Alia Randolph CMA (Phone Messages Pool ([id number]_[facility]))   To: Wilman Cyr MD;     Sent: 7/7/2021 10:35:25 AM CDT  Subject: FW: response to Stefan Cyr email           ---------------------  From: DANAY SHANE  To: [facility]  Sent: 07/07/2021 10:35 a.m. CDT  Subject: response to Stefan Cyr email  Thank you, Stefan.  I really appreciate your care and attention.  Danay

## 2022-02-16 NOTE — TELEPHONE ENCOUNTER
---------------------  From: Keisha Mtz CMA (Phone Messages Pool ([id number]_[facility]))   To: Wilman Cyr MD;     Sent: 5/3/2019 8:07:23 AM CDT  Subject: CONSUMER MESSAGE : immunizations     Do you advise patient to get the second MMR and continue to update polio?     Keisha Mtz....NIMA       ---------------------  From: DANAY GALDAMEZ  To: [facility]  Sent: 05/02/2019 04:48 p.m. 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  I noticed today that my WIR shows that I had 1 MMR but need another, and that I ve had 1 polio shot but need another 4.  Can this be true?    Thanks, Danay Galdamez---------------------  From: Wilman Cyr MD   To: Phone Messages Pool ([id number]_[facility]); DANAY GALDAMEZ    Sent: 5/3/2019 9:26:33 AM CDT  Subject: RE: CONSUMER MESSAGE : immunizations     Hi Danay,    If you think this information is inaccurate we can get a polio and MMR titer.  That would document whether or not you are protected.  Let me know if you would like me to order these titers.  They can be drawn anytime.    Rufina Cyr MDnoted

## 2022-02-16 NOTE — NURSING NOTE
Comprehensive Intake Entered On:  3/9/2021 2:56 PM CST    Performed On:  3/9/2021 2:49 PM CST by Vickie Whitmore CMA               Summary   Chief Complaint :   Colonoscopy consult    Weight Measured :   171.6 lb(Converted to: 171 lb 10 oz, 77.836 kg)    Height Measured :   67 in(Converted to: 5 ft 7 in, 170.18 cm)    Body Mass Index :   26.87 kg/m2 (HI)    Body Surface Area :   1.92 m2   Systolic Blood Pressure :   116 mmHg   Diastolic Blood Pressure :   66 mmHg   Mean Arterial Pressure :   83 mmHg   Peripheral Pulse Rate :   60 bpm   BP Site :   Right arm   Pulse Site :   Radial artery   BP Method :   Manual   HR Method :   Manual   Temperature Tympanic :   97.6 DegF(Converted to: 36.4 DegC)  (LOW)    Vickie Whitmore CMA - 3/9/2021 2:49 PM CST   Health Status   Allergies Verified? :   Yes   Medication History Verified? :   Yes   Medical History Verified? :   No   Pre-Visit Planning Status :   Completed   Tobacco Use? :   Never smoker   Vickie Whitmore CMA - 3/9/2021 2:49 PM CST   Consents   Consent for Immunization Exchange :   Consent Granted   Consent for Immunizations to Providers :   Consent Granted   Vickie Whitmore CMA - 3/9/2021 2:49 PM CST   Meds / Allergies   (As Of: 3/9/2021 2:56:12 PM CST)   Allergies (Active)   No known allergies  Estimated Onset Date:   Unspecified ; Created By:   Connie Croft; Reaction Status:   Active ; Category:   Drug ; Substance:   No known allergies ; Type:   Allergy ; Updated By:   Connie Croft; Reviewed Date:   3/9/2021 2:53 PM CST        Medication List   (As Of: 3/9/2021 2:56:12 PM CST)        ID Risk Screen   Recent Travel History :   No recent travel   Family Member Travel History :   No recent travel   Other Exposure to Infectious Disease :   Unknown   COVID-19 Testing Status :   No COVID-19 test performed   Vickie Whitmore CMA - 3/9/2021 2:49 PM CST   Social History   Social History   (As Of: 3/9/2021 2:56:12 PM CST)   Alcohol:  Denies Alcohol Use      Never   (Last  Updated: 8/3/2018 1:35:09 PM CDT by Connie Croft)          Tobacco:  Denies Tobacco Use      Never (less than 100 in lifetime)   (Last Updated: 3/9/2021 2:50:23 PM CST by Vickie Whitmore CMA)          Electronic Cigarette/Vaping:        Electronic Cigarette Use: Never.   (Last Updated: 3/9/2021 2:50:27 PM CST by Vickie Whitmore CMA)          Substance Abuse:  Denies Substance Abuse      Never   (Last Updated: 8/3/2018 1:35:18 PM CDT by Connie Croft)          Home/Environment:        Spouse/Partner name: Tru Savage.   (Last Updated: 8/3/2018 1:34:25 PM CDT by Connie Croft)          Nutrition/Health:        Type of diet: Regular.   (Last Updated: 8/3/2018 1:36:14 PM CDT by Connie Croft)          Exercise:  Regular exercise      Exercise frequency: 5-6 times/week.  Exercise type: Bicycling, Walking.   (Last Updated: 8/3/2018 1:36:27 PM CDT by Connie Croft)          Sexual:        Sexually active: Yes.  Sexual orientation: Straight or heterosexual.   (Last Updated: 8/3/2018 1:35:26 PM CDT by Connie Croft)          Other:        First menses age 15.  No history of abnormal Pap smear.   (Last Updated: 8/3/2018 1:34:48 PM CDT by Connie Croft)

## 2022-02-16 NOTE — NURSING NOTE
"Comprehensive Intake Entered On:  9/7/2021 5:38 PM CDT    Performed On:  9/7/2021 5:34 PM CDT by Perry BEAVER, Sarita               Summary   Chief Complaint :   c/o left knee injury over the summer--overuse.  heard \"pop\" while doing pushup a while back.  denies swelling but did have calf/foot swelling after injury.   Weight Measured :   171.6 lb(Converted to: 171 lb 10 oz, 77.836 kg)    Height Measured :   67 in(Converted to: 5 ft 7 in, 170.18 cm)    Body Mass Index :   26.87 kg/m2 (HI)    Body Surface Area :   1.92 m2   Systolic Blood Pressure :   122 mmHg   Diastolic Blood Pressure :   82 mmHg (HI)    Mean Arterial Pressure :   95 mmHg   Peripheral Pulse Rate :   73 bpm   BP Site :   Right arm   Pulse Site :   Radial artery   BP Method :   Electronic   HR Method :   Electronic   Sarita Amador MA - 9/7/2021 5:34 PM CDT   Health Status   Allergies Verified? :   Yes   Medication History Verified? :   Yes   Medical History Verified? :   Yes   Pre-Visit Planning Status :   Not completed   Tobacco Use? :   Never smoker   Sarita Amador MA - 9/7/2021 5:34 PM CDT   Consents   Consent for Immunization Exchange :   Consent Granted   Consent for Immunizations to Providers :   Consent Granted   Sarita Amador MA - 9/7/2021 5:34 PM CDT   Meds / Allergies   (As Of: 9/7/2021 5:38:55 PM CDT)   Allergies (Active)   No known allergies  Estimated Onset Date:   Unspecified ; Created By:   Connie Croft; Reaction Status:   Active ; Category:   Drug ; Substance:   No known allergies ; Type:   Allergy ; Updated By:   Connie Croft; Reviewed Date:   3/9/2021 2:53 PM CST        Medication List   (As Of: 9/7/2021 5:38:55 PM CDT)        Social History   Social History   (As Of: 9/7/2021 5:38:55 PM CDT)   Alcohol:  Denies Alcohol Use      Never   (Last Updated: 8/3/2018 1:35:09 PM CDT by Connie Croft)          Tobacco:  Denies Tobacco Use      Never (less than 100 in lifetime)   (Last Updated: 3/9/2021 2:50:23 PM CST by Haim HERRING, " Vickie)          Electronic Cigarette/Vaping:        Electronic Cigarette Use: Never.   (Last Updated: 3/9/2021 2:50:27 PM CST by Vickie Whitmore CMA)          Substance Abuse:  Denies Substance Abuse      Never   (Last Updated: 8/3/2018 1:35:18 PM CDT by Connie Croft)          Home/Environment:        Spouse/Partner name: Tru Savage.   (Last Updated: 8/3/2018 1:34:25 PM CDT by Connie Croft)          Nutrition/Health:        Type of diet: Regular.   (Last Updated: 8/3/2018 1:36:14 PM CDT by Connie Croft)          Exercise:  Regular exercise      Exercise frequency: 5-6 times/week.  Exercise type: Bicycling, Walking.   (Last Updated: 8/3/2018 1:36:27 PM CDT by Connie Croft)          Sexual:        Sexually active: Yes.  Sexual orientation: Straight or heterosexual.   (Last Updated: 8/3/2018 1:35:26 PM CDT by Connie Croft)          Other:        First menses age 15.  No history of abnormal Pap smear.   (Last Updated: 8/3/2018 1:34:48 PM CDT by Connie Croft)

## 2022-02-16 NOTE — TELEPHONE ENCOUNTER
---------------------  From: Adelaide Ramirez LPN ([facility] Message Pool ([id number]_[facility]))   To: Wilman Cyr MD;     Sent: 2/2/2021 7:53:08 AM CST  Subject: FW: forgot EKG attachment           ---------------------  From: DANAY SHANE  To: [facility] Message Pool ([address]_[facility])  Sent: 02/01/2021 04:19 p.m. CST  Subject: RE: forgot EKG attachment  Hi, I am hopeful that I can have my heart checked for whatever rhythm problems it might be having.  (See previous emails.)  My reluctance to make an appointment is just my own laziness and hope that all is just fine.  As time goes by, however, I realize I need to take responsibility for my health and should pursue this.  I can mail the physical copy of the EKG done on the [employer] in the ambulance to you, if that would help.  I realize that it is just a snapshot and not the highest-quality one that can be done.  What I m looking for is the quickest way to find out what, if anything, is amiss.  I m insured through [employer] with a high deductible, so I m also trying to keep things in order financially and well as being efficient with time.  Please advise and I ll follow through. My primary provider is Dr. Cyr.  ThanksDanay       Addendum by Wilman Cyr MD on December 13, 2020 12:57:32 PM CST  ---------------------  From: Wilman Cyr MD ([facility] Message Pool (32224_[facility]))  To: Phone Messages Pool (32224_[facility]); DANAY SHANE  Sent: 12/13/2020 12:57:32 PM CST  Subject: RE: forgot EKG attachment       Hi Danay,       Tod for the EKG.  While you copied the computer reading it is incomplete as I cannot see all leads.  I ask that you keep this and bring it in at our next appointment and I will enter it in in its entirety.       Stefan       Addendum by Mary Deluca CMA on December 13, 2020 9:14:37 AM CST  ---------------------  From: Mary Deluca CMA (Phone Messages Pool ([id number]_[facility]))  To: SVITLANA  TESHA HITCHCOCK  Sent: 12/13/2020 9:14:37 AM CST  Subject: FW: forgot EKG attachment       Addendum by Mary Deluca CMA on December 13, 2020 9:14:17 AM CST  ---------------------  From: Mary Deluca CMA (Phone Messages Pool (32224_[facility]))  To: [facility] Message Pool (32224_[facility]);  Sent: 12/13/2020 9:14:17 AM CST  Subject: FW: forgot EKG attachment  Entered by Mary Deluca CMA on December 13, 2020 9:14:07 AM CST  Baljeet Jon,       Dr. Cyr is out of the clinic until 12/15. I will forward your message for him to review at that time.       Mary Tineo            ---------------------  From: TESHA GALDAMEZ  To: [facility]  Sent: 12/13/2020 08:18 a.m. CST  Subject: forgot EKG attachment  For Dr. Cyr from Tesha Galdamez---------------------  [facility]   To: [facility] Message Pool (32224_[facility]); TESHA GALDAMEZ    Sent: 2/2/2021 10:02:31 AM CST  Subject: RE: forgot EKG attachment     Baljeet Jon,    I think the best and most efficient plan is to come in and get a documented EKG on file.  From there we can decide if you would like to get set up for a 2 week Holter monitor.    Stefan Cyr MD

## 2022-02-16 NOTE — TELEPHONE ENCOUNTER
Entered by Ary Gaytan RN on March 04, 2021 6:19:23 PM CST  Advised to schedule consult with KIRIT or PAOLA      ---------------------  From: DANAY GALDAMEZ  To: [facility]  Sent: 03/04/2021 05:07 p.m. CST  Subject: Do I need a referral to get a colonoscopy/  hi, I am thinking I should get a colonoscopy as my bowel habits have changed in the past couple of months and I m experiencing occasional discomfort in my abdomen.  Do I need a referral to get a screening colonoscopy?  THanks, Danay Galdamez

## 2022-02-16 NOTE — LETTER
(Inserted Image. Unable to display)   April 07, 2021  TESHA SHANE   [address] [place] [address]          Dear TESHA,      Thank you for selecting [facility] for your healthcare needs.    Our records indicate you are due for the following services:     Non-Fasting Labs    If you had your labs done at another facility or with Direct Access Lab Testing at [facility], please bring in a copy of the results to your next visit, mail a copy, or drop off a copy of your results to your Healthcare Provider.    (FYI   Regarding office visits: In some instances, a video visit or telephone visit may be offered as an option.)        To schedule an appointment or if you have further questions, please contact your clinic at [phone].      Powered by [employer]    Sincerely,    Wilman Cyr M.D.

## 2022-02-16 NOTE — TELEPHONE ENCOUNTER
---------------------  From: Myranda Desouza   To: [facility] Message Pool ([id number]_[facility]);     Sent: 2/2/2021 3:05:42 PM CST  Subject: General Message     Patient called saying that Dr. Cyr ordered an EKG, I don't see that in the record.  How should I schedule?    Myranda---------------------  From: Alia Randolph CMA ([facility] Message Pool (32224_[facility]))   To: Myranda Desouza;     Sent: 2/2/2021 3:19:12 PM CST  Subject: RE: General Message     Please schedule nurse only EKG on procedure schedule per CHT. RTC order submitted. CHT will follow up w/ patient once results are back.scheduled

## 2022-02-16 NOTE — PROGRESS NOTES
Patient:   TESHA SHANE            MRN: [id number]            FIN: [id number]               Age:   62 years     Sex:  Female     :  1955   Associated Diagnoses:   Well adult exam; Encounter for screening examination for impaired glucose regulation and diabetes mellitus; Encounter for screening for lipoid disorders   Author:   Wilman Cyr MD      Report Summary   DiagnosisCourse:  Well controlled. Orders  Orders   Lab (Gen Lab  Reference Lab):  Basic Metabolic Panel* ([facility]) (Order): Specimen Type: Serum, Collection Date: 2018 9:23 AM CDT  CBC (includes diff/plt)* ([facility]) (Order): Specimen Type: Blood, Collection Date: 2018 9:23 AM CDT  TSH* ([facility]) (Order): Specimen Type: Serum, Collection Date: 2018 9:23 AM CDT  Lipid panel with reflex to direct ldl* ([facility]) (Order): Specimen Type: Serum, Collection Date: 2018 9:23 AM CDT.  Counseled:  Patient.    Visit Information      Date of Service: 2018 08:16 am  Performing Location: [facility]  Encounter#: [id number]      Primary Care Provider (PCP):  Wilman Cyr MD# [id number]      Referring Provider:  Wilman Cyr MD# [id number]   Visit type:  Annual exam.    Source of history:  Self.    History limitation:  None.       Chief Complaint   2018 8:20 AM CDT    Pt in for Annual Px     Patient presents today for a general physical.      Well Adult History   Well Adult History   The general health status is good.  The effect on daily activities is no change in activity level, no change in eating habits and no change in sexual activity.        Review of Systems   Constitutional:  No fever, No chills, No sweats.    Eye:  No blurring, No double vision.    Respiratory:  No shortness of breath, No cough, No wheezing.    Cardiovascular:  No chest pain, No palpitations, No peripheral edema.    Breast:  Negative.    Gastrointestinal:  No nausea, No vomiting, No diarrhea, No constipation.     Genitourinary:  No dysuria.    Gynecologic:  Negative.    Hematology/Lymphatics:  No bruising tendency.    Endocrine:  No excessive thirst, No polyuria, No cold intolerance, No heat intolerance.    Musculoskeletal:  No joint pain.    Integumentary:  No rash.    Neurologic:  Not alert and oriented X4, No confusion, No numbness, No tingling, No headache.    Psychiatric:  Negative.    All other systems reviewed and negative      Health Status   Allergies:    Allergic Reactions (Selected)  No known allergies   Medications:  (Selected)      Problem list:    All Problems  Resolved: Childbirth / SNOMED CT 1211060927  Resolved: Pregnancy / SNOMED CT 178398059  Resolved: Pregnancy / SNOMED CT 851396432      Histories   Past Medical History:    Resolved  Childbirth (SNOMED CT 4438049106):  Resolved.  Comments:  2010 CDT 7:23 AM CDT - Connie Croft  X 2  Pregnancy (SNOMED CT 227536336):  Resolved in  at 32 years.  Pregnancy (SNOMED CT 812494203):  Resolved in  at 34 years.   Family History:    Rheumatoid arthritis  Mother  Heart disease  Grandmother (M)  Grandmother (P)  Grandfather (M)  Grandfather (P)  Osteoporosis  Mother  CA - Cancer  Father ()  Comments:  6/3/2015 11:59 AM - Lisbet Mayorga  bladder     Procedure history:    Tubal ligation (SNOMED CT 046502943) on 1990 at 34 Years.   Social History:        Alcohol Assessment: Denies Alcohol Use      Tobacco Assessment: Denies Tobacco Use  ,        Alcohol Assessment: Denies Alcohol Use      Tobacco Assessment: Denies Tobacco Use        Physical Examination   Vital Signs   2018 8:20 AM CDT Temperature Temporal 97.0 DegF  LOW    Peripheral Pulse Rate 78 bpm    Pulse Site Radial artery    HR Method Manual    Systolic Blood Pressure 112 mmHg    Diastolic Blood Pressure 68 mmHg    Mean Arterial Pressure 83 mmHg    BP Site Right arm    BP Method Manual      Measurements from flowsheet : Measurements   2018 8:20 AM CDT Height Measured -  Standard 67 in    Weight Measured - Standard 161 lb    BSA 1.86 m2    Body Mass Index 25.21 kg/m2  HI      General:  Alert and oriented, No acute distress.    Eye:  Pupils are equal, round and reactive to light, Extraocular movements are intact, Normal conjunctiva.    HENT:  Normocephalic, Tympanic membranes are clear, Normal hearing, Oral mucosa is moist, No pharyngeal erythema.    Neck:  Supple, No carotid bruit, No lymphadenopathy, No thyromegaly.    Respiratory:  Lungs are clear to auscultation, Breath sounds are equal.    Cardiovascular:  Regular rhythm, No murmur, Good pulses equal in all extremities, No edema.    Breast:  No mass, No tenderness, No discharge.    Gastrointestinal:  Soft, Non-tender, Normal bowel sounds, No organomegaly.    Genitourinary:  Normal genitalia for age and sex, No inguinal tenderness, No lesions.         Groin/ inguinal region: Within normal limits.         Labia: Within normal limits.         Cervix: No lesions.         Uterus: Within normal limits.         Ovaries: Within normal limits.         Adnexa: Within normal limits.    Musculoskeletal:  Normal range of motion.    Integumentary:  Warm, Dry, Pink.    Neurologic:  Alert, Oriented, Normal sensory, Normal motor function, No focal deficits.    Psychiatric:  Cooperative, Appropriate mood & affect, Normal judgment, Patient's PHQ9 and CAGE questionnaire reviewed and discussed with patient..       Impression and Plan   Diagnosis     Well adult exam (PJJ84-KC Z00.00).     Encounter for screening examination for impaired glucose regulation and diabetes mellitus (FUS29-MH Z13.1).     Encounter for screening for lipoid disorders (SDO97-VG Z13.220).     Course:  Well controlled.    Orders     Orders   Lab (Gen Lab  Reference Lab):  Basic Metabolic Panel* ([facility]) (Order): Specimen Type: Serum, Collection Date: 7/27/2018 9:23 AM CDT  CBC (includes diff/plt)* ([facility]) (Order): Specimen Type: Blood, Collection Date: 7/27/2018 9:23 AM  CDT  TSH* ([facility]) (Order): Specimen Type: Serum, Collection Date: 7/27/2018 9:23 AM CDT  Lipid panel with reflex to direct ldl* ([facility]) (Order): Specimen Type: Serum, Collection Date: 7/27/2018 9:23 AM CDT.     Counseled:  Patient.

## 2022-02-16 NOTE — TELEPHONE ENCOUNTER
---------------------  From: Kelsi Villar MA (Phone shipbeat Pool (32224_[facility]))   To: Wilman Cyr MD;     Sent: 1/28/2019 7:56:31 AM CST  Subject: CONSUMER MESSAGE: bone density results--Ger           ---------------------  From: DANAY SHANE  [facility]  Sent: 01/25/2019 08:54 p.m. CST  Subject: bone density results--Stefan Mejia,    I was glad to get my bone-density results.  But I m curious about the trend.  I had a BD test about 19-20 years ago and I wonder about how that result compares to this recent one.    I ll bet you didn t think I d really get a mammogram but I did!    Hope all is well.  Danay Baron---------------------  From: Wilman Cyr MD   To: Phone Messages Pool (32224_[facility]); DANAY SHANE    Sent: 1/29/2019 8:10:26 AM CST  Subject: RE: CONSUMER MESSAGE: bone density results--Ger Jon,    In order to compare the test needs to be done on the same machine.  That being said your T score is normal (and that means normal bone density for a young adult).  Z score takes age into account but doesn't correlate to fractures.  Having a normal T score is VERY good news!    Stefan Cyr MD

## 2022-02-16 NOTE — TELEPHONE ENCOUNTER
---------------------  From: Wilman Cyr MD   To: Phone Messages Pool (32224_[place] [facility]); DANAY SHANE    Sent: 5/10/2020 7:40:12 PM CDT  Subject: RE: mole on back     Hi Tru,    This looks like a seborrheic keratosis to me (benign).  If this gets bigger or changes shape we would need to take a look at it in the office.      Stefan Cyr MD      ---------------------  From: Charu Goldsmith CMA (Phone Messages Pool ([id number]_[place] [facility]))   To: Wilman Cyr MD;     Sent: 5/8/2020 8:34:47 AM CDT  Subject: FW: mole on back       PLEASE DO NOT SAVE THIS MESSAGE TO THE CHART. THIS IS ONLY TO VIEW THE ATTACHED PHOTO.  MESSAGE WAS SENT TO YOU UNDER CORRECT PATIENTS CHART.   ---------------------  From: DANAY SHANE  To: [facility]  Sent: 05/08/2020 08:25 a.m. CDT  Subject: mole on back  Hi, Stefan, this is a mole on Tru s back.  I m sending the photo in because he pleads ignorance as to how to get onto the web site.  It s just faster doing it this way.  His email is [email or web address] so you can respond to him and not violate HIPPA.  If he wants to share your response with me, he will.    The mole is somewhat raised on one side.  It feels like a scab that is starting to come off.    He has another one that is somewhat smaller and longer, less round.  Slightly raised.    I hope things are going well for you and Chacha and family.  We are all fine.  I m still doing [facility] and that is going ok, too.    THanks, Danay

## 2022-02-16 NOTE — TELEPHONE ENCOUNTER
"---------------------  From: Mary Deluca CMA (Phone Messages Pool ([id number]_[facility]))   To: [facility] Message Pool (32224_[facility]);     Sent: 12/13/2020 9:12:48 AM CST  Subject: FW: RE: heart rhythm           ---------------------  From: TESHA SHANE  To: [facility]  Sent: 12/13/2020 07:43 a.m. CST  Subject: FW: RE: heart rhythm  Stefan KING, I am sorry the [facility] is closed.  It was a good place to go for me--nice and quiet and such a courteous, friendly staff.  I hope they all get jobs in the new clinic if they want them.  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  I was the person to whom it was being applied.  The RA lead was not working, to the point that we switched to a different set of leads from another [place], and still had trouble.  FInally we got a read, after having Itz Morton, paramedic, help us.  I m attaching the photo I took of the read out.    At first he said, \"Perfect,\" and left.  Then he came back and asked to see it again.  He looked at it more closely and said, \"At some point, you are going to be put on a-fib meds.\"  I told him very briefly about my episode in [place] (racing heart, unable to make it slow down) and he said, yeah, that doesn t surprise me.  (When I asked why it didn t surprise him, he said, Because you re old!   He can get away with that because he is slightly older than I am.)    For two reasons I had decided not to do anything about the racing heart beat I wrote to you earlier:  1.  Lazy.  2.  Felt it was a one-off and I could just stay hydrated to avoid future episodes.    But now I am concerned because I don t want to have a stroke.  I want to be a good citizen about my health and take precautions to stay healthy and active.  (Mandy are having a baby in late May and I really want to be around to spoil that kid!)    So if you would make whatever recommendations and referrals " you think are appropriate, I will really appreciate that.  My insurance is through [employer], in case you need to know the network.    Thanks very much, Stefan.  Miguelaura to Chacha and best wishes to you and family for a good [place].    Danay       Addendum by Wilman Cyr MD on September 29, 2020 9:41:35 AM CDT  ---------------------  From: Wilman Cyr MD  To: [facility] Message Pool ([id number]_[facility]); DANAY SHANE  Sent: 9/29/2020 9:41:35 AM CDT  Subject: RE: heart rhythm       Hi Danay,       Congratulations on your writing residency.       Dehydration is a likely cause of your rapid heart rate.  There are two possibilities here.  If the rapid rate is irregular then there is concern for atrial fibrillation which needs to be treated.  The biggest risk is a stroke when you go into and out of atrial fibrillation.       The other fast heart rate would be regular and most likely related to low volume treated with drinking with more fluids.       We need to get a tracing during these episodes.  Some people can do it on their watches or another option would be to go to an emergency room during an episode and have them run an EKG.       A final option is to see a cardiologist and they can implant a recording device under the skin that monitors your heart beat for 2 weeks or more.       If you would like to see a cardiologist I can set it up when you are home.  We need to diagnose the actual rhythm before we can look at treatment.       Hope this helps.       Stefan Cyr MD       Addendum by Alia Amaro on September 28, 2020 5:09:40 PM CDT  ---------------------  From: Alia Amaro ([facility] Message Pool ([id number]_[facility]))  To: Wilman Cyr MD;  Sent: 9/28/2020 5:09:40 PM CDT  Subject: FW: heart rhythm  ---------------------  From: Sugey William RN (Phone Messages Pool (45206_[facility]))  To: [facility] Message Pool (32224_[place]);  Sent: 9/28/2020 4:56:38 PM  CDT  Subject: FW: heart rhythm                      ---------------------  From: TESHA SHANE  To: [facility]  Sent: 09/27/2020 01:23 p.m. CDT  Subject: heart rhythm  Dear Stefan Cyr,  I  hope you and family are well.  Sorry to bother you with this, but here goes:  A year ago, and I may have written you about this, [place].  I ate a wild plum, as I am wont to do and have for decades, and then, later, down at the house, I experienced a racing heartbeat.  I took a Benadryl tablet and we started to drive to [facility], but by [place] I was fine and we came home.  End of story.  Now I am in [place].  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  In APril Tru and I will move out there and live till October.  The nearest town is [place], two hours away on rough, steep, curvy [place] roads.  Tru and I were invited to spend a few days there to experience what it is going to be like, so we drove out and were there for a few days.  I took a hike down to the river and back.  It is a steep climb up.  I was talking with the current writer in residence and doing fine, we were going slowly, and then I felt something like a fish flop in my chest.  I went up to the cabin and sat down and felt my heart racing.  I couldn t make it slow down.  I went out to the car and reclined the seat and tried deep coughs, straining as if for a bowel movement, etc.  My throat felt constricted and my neck muscles were sore, but that could be from my backpack I had been wearing.  The throat thing ended fairly soon, but I could not get the racing heart beat down below 134, which is twice what it normally would be after a long hike and then a rest.  Eventually, after about an hour or so, it slowed to a regular beat.  I thought about the causes and here is what I think:  I was not drinking much at all.  I  normally get a good bit of  exercise walking to the bathroom at night to pee, but I was only having to get up once the nights I was there.  I just don t drink a lot of liquids, and I was drinking less than usual while there.  Also, I had two mugs of high-powered coffee in the morning.  It was delicious, but I normally have a couple of mugs of a less-amped coffee, half de-caf and half regular, in the morning.  Once we were back in range of cell service (there is no phone or wifi at this place) I looked up dehydration and heart rhythm and read that being dehydrated can lead to low blood volume and thus the heart has to race to keep things up, as in hypovolemic shock.  I can t remember if I was dehydrated for the first incident, the [place] Incident, as I shall call it.  But I know I was for this one.  Tru is understandably concerned about our being so far from services and me with unanswered questions about my heart.  I don t know how we can find out more, if I m only having these incidents every 15 months or so.  But if you have ideas, I am all ears.  [place]  You and Chacha are welcome to visit, if you wish--there are two cabins, both with indoor plumbing.  No phones or internet, and it is a drive of a few miles to get to within striking distance of possible cell coverage.  Very best wishesDanay---------------------  From: Wilman Cyr MD ([facility] Message Pool ([id number]_[facility]))   To: Phone Messages Pool (32224_[place]);     Sent: 12/13/2020 1:08:55 PM CST  Subject: RE: RE: heart rhythm     Baljeet Jon,    This is part 2.  I saw your picture of your EKG but as I said earlier it was incomplete and not readable.  I'm not sure I would be willing to say you are going to eventually have atrial fib more than anyone else.  I would be happy to have you see a cardiologist regarding the episode in [place].  One simple thing you can do is get an iWatch or similar that can monitor you for atrial fib.   If you would like to see a cardiologist let me know.    Stefan

## 2022-02-16 NOTE — TELEPHONE ENCOUNTER
---------------------  From: Kim Treviño CMA (Phone Messages Pool ([id number]_[place] [facility]))   To: Wilman Cyr MD;     Sent: 7/7/2021 8:12:02 AM CDT  Subject: FW: lavinia michelle           ---------------------  From: DANAY GALDAMEZ  To: [facility]  Sent: 07/06/2021 07:16 p.m. CDT  Subject: lavinia cyr  Dear Catherine Aviles from [place], where we are spending [place] in the wilderness in a [place].  Unfortunately I hurt my knee.  It was getting dodgy; about ten days ago I started feeling like it was stiffening and felt unsteady.  I stopped my hiking regime--it is very hilly here.  There are no level places outside the cabin.  I stepped up my upper-body workouts.  This morning I was doing push-ups and suddenly I felt and heard a pop in the back of my left, dodgy knee.  It hurt!  I got up carefully and found that I could not put weight on it.  We drove the rough two hours into town and went to the ER and they did an ultrasound and found no Bakers cyst.  An MRI was called for but the soonest I could get in is a week from now.  All of this is out of my insurance network ([employer]).  Tru and I think I should come home to be in network and also in a system that I understand a bit better than the [place] one.  If I schedule a flight for a couple of days from now, could I get an MRI and see an orthopedist, if called for, within a week or ten days?  THanks, Danay Galdamez  ** Submitted: **  Order:Referral (Request)  Details:  7/7/2021 9:59 AM CDT, Referred to: Orthopaedics, Reason for referral: Left knee injury, Left knee injury         Signed by Wilman Cyr MD  7/7/2021 2:59:00 PM [facility]    ** Submitted: **  Order:MRI Knee w/o Contrast Left (Request)  Details:  Left knee injury         Signed by Wilman Cyr MD  7/7/2021 2:59:00 PM [facility]---------------------  From: Wilman Cyr MD   To: Phone Messages Pool ([id number]_[facility]); DANAY GALDAMEZ    Cc: Referral  Coordinators Pool ([id number]_[facility]);      Sent: 7/7/2021 10:02:05 AM CDT  Subject: RE: stefan Jon,    I put the order in for both the MRI and referral to ortho.  Call the clinic to let them know when they can schedule it.  Since you have [employer] another option would be to go to [facility] on [address] in [place] that has walk in ortho care and is owned by  so I'm sure it would be covered.    Stefan---------------------  From: Alia Randolph CMA (Phone Messages Pool ([id number]_[place] [facility] [place]))   To: Referral Coordinators Pool ([id number]_[facility]);     Sent: 7/7/2021 10:35:49 AM CDT  Subject: FW: stefan Prince

## 2022-02-16 NOTE — TELEPHONE ENCOUNTER
---------------------  From: TESHA SHANE  To: [facility]  Sent: 05/05/2021 12:46 p.m. CDT  Subject: heart question  HIStefan,  You might remember that I have had a couple of heart-racing episodes.  I wrote to you earlier about it, I went in and had an EKG that was fine, and we decided the situation must have happened because I?d let myself get dehydrated.  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  Today I went for a hike by myself (usually [place]) and after a tramp on the [place] I climbed down a steep slope and sat by the beautiful river?really, this is such a beautiful place!!!  I had a granola bar and some water and then decided to climb back up.  I first had to get off the rock I was on, and I did that, and suddenly I felt my heart turn over and go into overdrive.  I have one of those smart watches and it does an EKG.  I sat down and tried to get it to work.  But the results were inconclusive because, it said, my heart rate was 164 and too fast for it to analyze.  I sat and tried to quiet it.  I finished off my water in the bottle, in case it was dehydration causing the problem.  I tried another EKG with the same results.  I could not get that heart to settle.  Crazy, because I have been getting great exercise here.  Every day I hike down to the river, which is 800 vertical feet from the cabin.  There?s a trail of switchbacks that goes down to it, taking ? mile to get down to the river.  I go down, savor the river, and then hike back up and I really work myself, arriving at the top with a heart rate of usually 140, which then settles back to under 100 easily within a short time.  My resting heart rate is 55.  Most days I log about 20,000 steps by end of day.  So, back to the racing heart.  I did that maneuver in which you squeeze hard, as if you are giving birth, and  I held that HARD for a while, and then I finally relaxed and took in some air, I was fine.  Heart rate down to less than 100.  I tried the EKG and it said normal sinus rhythm.  I then walked home slowly, taking my time.  I had to climb back up to the cabin, which I did slowly, and I have been fine ever since.  But both Tru and I are concerned and wonder what to make of this.  I don?t think my heart itself is the problem.  One of my brothers had to have an ablation; maybe I need that?  I really don?t know.  I?m composing this in the evening in the cabin.  We?ll go to town the day after tomorrow and I?ll paste it into the email part of the [employer] web site and send it as soon as we can get connected.  Then we?ll be in town for a few hours?then we will go back to isolation.  We do have a radio phone here, and if you feel you need to reach me, the number is [phone].  You have to dial the 1 first.  It takes a few rings before we get a ring here in the cabin, so give it time.  BUT that kind of urgency is probably not necessary.  We will be back in coverage on May 11, so I could get your response then.  We will be coming back in June, I believe, to see Lucien?s new baby.  It is due on the 27th of May.  We won?t rush back for the earliest days of its life (though of course I want to).  If I could get an appointment with someone who is a heart doctor, if you think that is houser, I could see that person then.  I know it is tough to get an appt with a specialist, so I am prepared for that to be maybe not possible.    Danay Galdamez

## 2022-02-16 NOTE — NURSING NOTE
PPD Reading POC Entered On:  4/11/2019 1:03 PM CDT    Performed On:  4/11/2019 1:03 PM CDT by Ary Gaytan RN               PPD Reading   PPD mm of Induration :   0 mm   PPD Interpretation :   Negative   Ary Gaytan RN - 4/11/2019 1:03 PM CDT

## 2022-02-16 NOTE — TELEPHONE ENCOUNTER
Entered by Mary Deluca CMA on December 13, 2020 9:14:07 AM CST  Baljeet Jon,    Dr. Cyr is out of the clinic until 12/15. I will forward your message for him to review at that time.    Mary Tineo      ---------------------  From: DANAY GALDAMEZ  To: [facility]  Sent: 12/13/2020 08:18 a.m. CST  Subject: forgot EKG attachment  For Dr. Cyr from Danay Galdamez---------------------  From: Mary Deluca CMA (Phone Messages Pool ([id number]_[place]))   To: T Message Pool (53124_[facility]);     Sent: 12/13/2020 9:14:17 AM CST  Subject: FW: forgot EKG attachment---------------------  From: Mary Deluca CMA (Phone Messages Pool ([id number]_[place]))   To: DANAY GALDMAEZ    Sent: 12/13/2020 9:14:37 AM CST  Subject: FW: forgot EKG attachment---------------------  From: Wilman Cyr MD ([facility] Message Pool ([id number]_[place]))   To: Phone Messages Pool ([id number]_[place] [facility] [place]); DANAY GALDAMEZ    Sent: 12/13/2020 12:57:32 PM CST  Subject: RE: forgot EKG attachment     Baljeet Jon,    Tod for the EKG.  While you copied the computer reading it is incomplete as I cannot see all leads.  I ask that you keep this and bring it in at our next appointment and I will enter it in in its entirety.    Stefan

## 2022-02-18 ENCOUNTER — COMMUNICATION - RIVER FALLS (OUTPATIENT)
Dept: FAMILY MEDICINE | Facility: CLINIC | Age: 67
End: 2022-02-18
Payer: COMMERCIAL

## 2022-02-18 ENCOUNTER — OFFICE VISIT - RIVER FALLS (OUTPATIENT)
Dept: FAMILY MEDICINE | Facility: CLINIC | Age: 67
End: 2022-02-18
Payer: COMMERCIAL

## 2022-03-02 VITALS — HEIGHT: 67 IN | BODY MASS INDEX: 26.88 KG/M2

## 2022-03-02 NOTE — NURSING NOTE
Comprehensive Intake Entered On:  2/18/2022 11:29 AM CST    Performed On:  2/18/2022 11:26 AM CST by Vickie Whitmore CMA               Summary   Chief Complaint :   c/o sinus pressure, pain in the eyes and teeth, sinus headche, cough for the past 10 days not getting better, poss sinus infection verbal consent given for telephone visit   Height Measured :   67 in(Converted to: 5 ft 7 in, 170.18 cm)    Vickie Whitmore CMA - 2/18/2022 11:26 AM CST   Health Status   Allergies Verified? :   Yes   Medication History Verified? :   Yes   Medical History Verified? :   No   Pre-Visit Planning Status :   Not completed   Tobacco Use? :   Never smoker   Vickie Whitmore CMA - 2/18/2022 11:26 AM CST   Consents   Consent for Immunization Exchange :   Consent Granted   Consent for Immunizations to Providers :   Consent Granted   Vickie hWitmore CMA - 2/18/2022 11:26 AM CST   Meds / Allergies   (As Of: 2/18/2022 11:29:20 AM CST)   Allergies (Active)   No known allergies  Estimated Onset Date:   Unspecified ; Created By:   Connie Croft; Reaction Status:   Active ; Category:   Drug ; Substance:   No known allergies ; Type:   Allergy ; Updated By:   Connie Croft; Reviewed Date:   2/18/2022 11:28 AM CST        Medication List   (As Of: 2/18/2022 11:29:20 AM CST)        Social History   Social History   (As Of: 2/18/2022 11:29:20 AM CST)   Alcohol:  Denies Alcohol Use      Never   (Last Updated: 8/3/2018 1:35:09 PM CDT by Connie Croft)          Tobacco:  Denies Tobacco Use      Never (less than 100 in lifetime)   (Last Updated: 3/9/2021 2:50:23 PM CST by Vickie Whitmore CMA)          Electronic Cigarette/Vaping:        Electronic Cigarette Use: Never.   (Last Updated: 3/9/2021 2:50:27 PM CST by Vickie Whitmore CMA)          Substance Abuse:  Denies Substance Abuse      Never   (Last Updated: 8/3/2018 1:35:18 PM CDT by Connie Croft)          Home/Environment:        Spouse/Partner name: Tru Marshdy.   (Last Updated: 8/3/2018 1:34:25 PM CDT  by Connie Croft)          Nutrition/Health:        Type of diet: Regular.   (Last Updated: 8/3/2018 1:36:14 PM CDT by Connie Croft)          Exercise:  Regular exercise      Exercise frequency: 5-6 times/week.  Exercise type: Bicycling, Walking.   (Last Updated: 8/3/2018 1:36:27 PM CDT by Connie Croft)          Sexual:        Sexually active: Yes.  Sexual orientation: Straight or heterosexual.   (Last Updated: 8/3/2018 1:35:26 PM CDT by Connie Croft)          Other:        First menses age 15.  No history of abnormal Pap smear.   (Last Updated: 8/3/2018 1:34:48 PM CDT by Connie Croft)

## 2022-03-02 NOTE — TELEPHONE ENCOUNTER
---------------------  From: [place]/Eliana BEAVER (Phone Messages Pool ([id number]_[facility]))   To: DANAY SHANE    Sent: 2/18/2022 10:09:32 AM CST  Subject: FW: sick ten days and not getting better     Danay,    I suggest you call our clinic to schedule a telephone visit to discuss you symptoms further.    Eliana PÉREZ [facility]         ---------------------  From: DANAY SHANE  To: [facility]  Sent: 02/18/2022 09:47 a.m. CST  Subject: sick ten days and not getting better  HI, I have had a cold for more than a week.   Lots of snot, sort of opaque gray green stuff.  I felt it was pretty much better on Wednesday, but Thursday I woke up with a sore throat and headache.  I took it easy and napped for a while in the afternoon, but nothing seemed to help.  Now today I feel just as ill.  I suspect a sinus infection because my teeth and eyes hurt, and I have a pressure headache.  I did a Binax Covid test and it was negative.    Should I see someone, or can someone prescribe antibiotics or give me some way to get relief from this?    I m trying Sudafed this morning.  Too soon to tell.    Thanks, Danay Shane

## 2022-03-02 NOTE — PROGRESS NOTES
Chief Complaint    c/o sinus pressure, pain in the eyes and teeth, sinus headche, cough for the past 10 days not getting better, poss sinus infection verbal consent given for telephone visit  History of Present Illness       Patient is a 66-year-old female on telemedicine visit today       Call time 11:34 AM to 11:41 AM       Patient at home in [facility]       Physician clinic in [facility]       Patient has had upper respiratory symptoms for about 10 days.  At first they seem be improving but now her symptoms are getting worse.  She has developed pain in her face as well as behind her eyes and in her teeth.  She has pressure in her sinuses and has a headache as well.  She did a home Covid test which was negative.  Her O2 sat med registered at home was 95%.  She denies any shortness of breath.       She has a lot of nasal drainage as well as some postnasal drip and a mild sore throat.       No fevers or chills  Review of Systems       Negative except as listed in HPI  Physical Exam   Vitals & Measurements    HT: 67 in        Telemedicine visit       Patient is alert, oriented, and in no acute distress       Voice is a little bit scratchy  Assessment/Plan       Sinusitis (J32.9)          We will treat with Augmentin twice daily for 10 days         Flonase nasal spray daily         Tylenol and ibuprofen as needed for pain         Follow-up if not improving as anticipated         Ordered:          amoxicillin-clavulanate, = 1 tab(s), Oral, q12 hrs, x 10 day(s), # 20 tab(s), 0 Refill(s), Type: Acute, Pharmacy: [facility], 1 tab(s) Oral q12 hrs,x10 day(s), 67, in, 02/18/22 11:26:00 CST, Height Measured, 171.6, lb, 09/07/21 17:34:00 CDT, Weight Measured, (Ordered)          fluticasone nasal, = 2 spray(s), Nasal, daily, # 16 gm, 0 Refill(s), Type: Maintenance, Pharmacy: [facility], 2 spray(s) Nasal daily, 67, in, 02/18/22 11:26:00 CST, Height Measured, 171.6, lb, 09/07/21 17:34:00 CDT, Weight  Measured, (Ordered)           Patient Information     Name:TESHA SHANE      Address:      [address] [place] [id number]     Sex:Female     YOB: 1955     [phone]     Emergency Contact:ONOFRE POOLE     MRN:[id number]     FIN:[id number]     Location:[facility]     Date of Service:02/18/2022      Primary Care Physician:       Wilman Cyr MD, [phone]      Attending Physician:       Melissa Castro MD, [phone]  Problem List/Past Medical History    Ongoing     No qualifying data    Historical     Childbirth       Comments: X 2     Pregnancy     Pregnancy  Procedure/Surgical History     Tubal ligation (05/06/1990)  Medications    Augmentin 875 mg-125 mg oral tablet, 1 tab(s), Oral, q12 hrs    Flonase 50 mcg/inh nasal spray, 2 spray(s), Nasal, daily  Allergies    No known allergies  Social History    Smoking Status     Never smoker     Alcohol - Denies Alcohol Use      Never     Electronic Cigarette/Vaping      Electronic Cigarette Use: Never.     Exercise - Regular exercise      Exercise frequency: 5-6 times/week. Exercise type: Bicycling, Walking.     Home/Environment      Spouse/Partner name: Onofre Poole.     Nutrition/Health      Type of diet: Regular.     Other      First menses age 15. No history of abnormal Pap smear.     Sexual      Sexually active: Yes. Sexual orientation: Straight or heterosexual.     Substance Abuse - Denies Substance Abuse      Never     Tobacco - Denies Tobacco Use      Never (less than 100 in lifetime)  Family History    CA - Cancer: Father.    Heart disease: Grandfather (M), Grandfather (P), Grandmother (M) and Grandmother (P).    Lymphoma: Brother.    Osteoporosis: Mother.    Rheumatoid arthritis: Mother.  Immunizations       Scheduled Immunizations       Dose Date(s)       IPV       06/23/2000       Other Immunizations               Hep A       06/23/2000, 06/14/2001       MMR (measles/mumps/rubella)        08/27/2018       influenza virus vaccine, inactivated       09/17/2018, 09/18/2019       tetanus/diphth/pertuss (Tdap) adult/adol       03/31/2015       hepatitis B adult vaccine       08/17/2018, 09/17/2018       zoster vaccine, inactivated       12/13/2019, 02/18/2020

## 2022-03-04 NOTE — TELEPHONE ENCOUNTER
Entered by Mary Deluca CMA on December 13, 2020 9:14:07 AM CST  Baljeet Jon,    Dr. Cyr is out of the clinic until 12/15. I will forward your message for him to review at that time.    Mary Tineo      ---------------------  From: DANAY GALDAMEZ  To: [facility]  Sent: 12/13/2020 08:18 a.m. CST  Subject: forgot EKG attachment  For Dr. Cyr from Danay Galdamez  ---------------------  From: Mary Deluca CMA (Phone Messages Pool ([id number]))   To: T Message Pool ([id number]_[facility]);     Sent: 12/13/2020 9:14:17 AM CST  Subject: FW: forgot EKG attachment  ---------------------  From: Mary Deluca CMA (Phone Messages Pool ([id number]))   To: DANAY GALDAMEZ    Sent: 12/13/2020 9:14:37 AM CST  Subject: FW: forgot EKG attachment  ---------------------  From: Wilman Cyr MD (CHT Message Pool ([id number]))   To: Phone Messages Pool ([id number]); DANAY GALDAMEZ    Sent: 12/13/2020 12:57:32 PM CST  Subject: RE: forgot EKG attachment     Baljeet Jon,    Tod for the EKG.  While you copied the computer reading it is incomplete as I cannot see all leads.  I ask that you keep this and bring it in at our next appointment and I will enter it in in its entirety.    Stefan

## 2022-05-29 ENCOUNTER — HEALTH MAINTENANCE LETTER (OUTPATIENT)
Age: 67
End: 2022-05-29

## 2022-07-27 ENCOUNTER — MYC MEDICAL ADVICE (OUTPATIENT)
Dept: FAMILY MEDICINE | Facility: CLINIC | Age: 67
End: 2022-07-27

## 2022-08-19 ENCOUNTER — MYC MEDICAL ADVICE (OUTPATIENT)
Dept: FAMILY MEDICINE | Facility: CLINIC | Age: 67
End: 2022-08-19

## 2022-08-19 DIAGNOSIS — J01.01 ACUTE RECURRENT MAXILLARY SINUSITIS: Primary | ICD-10-CM

## 2022-10-03 ENCOUNTER — HEALTH MAINTENANCE LETTER (OUTPATIENT)
Age: 67
End: 2022-10-03

## 2022-12-29 ENCOUNTER — HOSPITAL ENCOUNTER (OUTPATIENT)
Dept: CARDIOLOGY | Facility: CLINIC | Age: 67
Discharge: HOME | End: 2022-12-29
Attending: INTERNAL MEDICINE | Admitting: INTERNAL MEDICINE
Payer: MEDICARE

## 2022-12-29 DIAGNOSIS — R00.0 TACHYCARDIA: ICD-10-CM

## 2022-12-29 PROCEDURE — 93325 DOPPLER ECHO COLOR FLOW MAPG: CPT | Mod: TC

## 2022-12-29 PROCEDURE — 93321 DOPPLER ECHO F-UP/LMTD STD: CPT | Mod: TC

## 2022-12-29 PROCEDURE — 93350 STRESS TTE ONLY: CPT | Mod: TC

## 2022-12-29 PROCEDURE — 93325 DOPPLER ECHO COLOR FLOW MAPG: CPT | Mod: 26 | Performed by: INTERNAL MEDICINE

## 2022-12-29 PROCEDURE — 93016 CV STRESS TEST SUPVJ ONLY: CPT | Performed by: INTERNAL MEDICINE

## 2022-12-29 PROCEDURE — 93018 CV STRESS TEST I&R ONLY: CPT | Performed by: INTERNAL MEDICINE

## 2022-12-29 PROCEDURE — 93350 STRESS TTE ONLY: CPT | Mod: 26 | Performed by: INTERNAL MEDICINE

## 2022-12-29 PROCEDURE — 93321 DOPPLER ECHO F-UP/LMTD STD: CPT | Mod: 26 | Performed by: INTERNAL MEDICINE

## 2023-02-06 ENCOUNTER — HOSPITAL ENCOUNTER (OUTPATIENT)
Dept: MAMMOGRAPHY | Facility: CLINIC | Age: 68
Discharge: HOME | End: 2023-02-06
Attending: FAMILY MEDICINE | Admitting: FAMILY MEDICINE
Payer: MEDICARE

## 2023-02-06 DIAGNOSIS — Z12.31 VISIT FOR SCREENING MAMMOGRAM: ICD-10-CM

## 2023-02-06 PROCEDURE — 77067 SCR MAMMO BI INCL CAD: CPT

## 2023-02-12 ENCOUNTER — HEALTH MAINTENANCE LETTER (OUTPATIENT)
Age: 68
End: 2023-02-12

## 2023-02-15 DIAGNOSIS — Z12.11 SPECIAL SCREENING FOR MALIGNANT NEOPLASMS, COLON: Primary | ICD-10-CM

## 2023-03-01 ENCOUNTER — OFFICE VISIT (OUTPATIENT)
Dept: CARDIOLOGY | Facility: CLINIC | Age: 68
End: 2023-03-01
Payer: MEDICARE

## 2023-03-01 VITALS
DIASTOLIC BLOOD PRESSURE: 70 MMHG | RESPIRATION RATE: 16 BRPM | BODY MASS INDEX: 28.14 KG/M2 | WEIGHT: 179.3 LBS | HEART RATE: 68 BPM | HEIGHT: 67 IN | SYSTOLIC BLOOD PRESSURE: 128 MMHG

## 2023-03-01 DIAGNOSIS — R00.0 TACHYCARDIA: Primary | ICD-10-CM

## 2023-03-01 PROCEDURE — 99213 OFFICE O/P EST LOW 20 MIN: CPT | Performed by: INTERNAL MEDICINE

## 2023-03-01 RX ORDER — MULTIVITAMIN,THERAPEUTIC
1 TABLET ORAL DAILY
COMMUNITY
End: 2023-11-21

## 2023-03-01 NOTE — PROGRESS NOTES
Patient with abrupt onset of tachycardia who has had infrequent episodes which were more brief in nature.  She has not been able to catch any to this point.  No syncopal or near syncopal episodes.    She is exercising and doing intervals on her bicycle which is on a  she has no difficulties with this.  Her stress echocardiogram was unremarkable and she was able to go 9 minutes and says she could have gone a lot longer.    We reviewed the stress echocardiogram.  We talked about catching the rhythm disturbance by either going to the clinic, having the paramedics checked the rhythm if she is volunteering, or a wearable device such as a watch or kardia device.    I have encouraged her to maintain an active lifestyle and to call us if she has questions or concerns.  We will schedule a follow-up in 18 months.    Thanks,    Feliciano

## 2023-03-01 NOTE — LETTER
3/1/2023    Wilman Cyr MD  [address] [facility] [address]    RE: Danay Galdamez       Dear Colleague,     I had the pleasure of seeing Danay Galdamez in the [facility].  Patient with abrupt onset of tachycardia who has had infrequent episodes which were more brief in nature.  She has not been able to catch any to this point.  No syncopal or near syncopal episodes.    She is exercising and doing intervals on her bicycle which is on a  she has no difficulties with this.  Her stress echocardiogram was unremarkable and she was able to go 9 minutes and says she could have gone a lot longer.    We reviewed the stress echocardiogram.  We talked about catching the rhythm disturbance by either going to the clinic, having the paramedics checked the rhythm if she is volunteering, or a wearable device such as a watch or kardia device.    I have encouraged her to maintain an active lifestyle and to call us if she has questions or concerns.  We will schedule a follow-up in 18 months.    Thanks,    Feliciano      Thank you for allowing me to participate in the care of your patient.      Sincerely,     Feliciano Bee MD     [facility]  cc: No referring provider defined for this encounter.

## 2023-04-16 ENCOUNTER — MYC MEDICAL ADVICE (OUTPATIENT)
Dept: FAMILY MEDICINE | Facility: CLINIC | Age: 68
End: 2023-04-16
Payer: MEDICARE

## 2023-04-16 DIAGNOSIS — R43.0 LOSS OF SENSE OF SMELL: Primary | ICD-10-CM

## 2023-04-17 NOTE — TELEPHONE ENCOUNTER
See MyChart from patient needing PCP review.    Referral request    Manda Cazares RN  [facility]

## 2023-04-26 ENCOUNTER — TRANSFERRED RECORDS (OUTPATIENT)
Dept: HEALTH INFORMATION MANAGEMENT | Facility: CLINIC | Age: 68
End: 2023-04-26
Payer: MEDICARE

## 2023-05-26 ENCOUNTER — VIRTUAL VISIT (OUTPATIENT)
Dept: FAMILY MEDICINE | Facility: CLINIC | Age: 68
End: 2023-05-26
Payer: MEDICARE

## 2023-05-26 DIAGNOSIS — J01.10 ACUTE NON-RECURRENT FRONTAL SINUSITIS: Primary | ICD-10-CM

## 2023-05-26 PROCEDURE — 99213 OFFICE O/P EST LOW 20 MIN: CPT | Mod: 95 | Performed by: FAMILY MEDICINE

## 2023-05-26 RX ORDER — FLUTICASONE PROPIONATE 50 MCG
1 SPRAY, SUSPENSION (ML) NASAL
Qty: 16 G | Refills: 0 | Status: SHIPPED | OUTPATIENT
Start: 2023-05-26 | End: 2023-11-21

## 2023-05-26 RX ORDER — AMOXICILLIN 875 MG
875 TABLET ORAL 2 TIMES DAILY
Qty: 20 TABLET | Refills: 0 | Status: SHIPPED | OUTPATIENT
Start: 2023-05-26 | End: 2023-06-05

## 2023-05-26 ASSESSMENT — ENCOUNTER SYMPTOMS
SORE THROAT: 1
COUGH: 1
HEADACHES: 1

## 2023-05-26 NOTE — PROGRESS NOTES
"Danay is a 67 year old who is being evaluated via a billable video visit.      How would you like to obtain your AVS? MyChart  If the video visit is dropped, the invitation should be resent by: Send to e-mail at: [email or web address]  Will anyone else be joining your video visit? No      Assessment & Plan     Acute non-recurrent frontal sinusitis  Supportive care  - amoxicillin (AMOXIL) 875 MG tablet; Take 1 tablet (875 mg) by mouth 2 times daily for 10 days  - fluticasone (FLONASE) 50 MCG/ACT nasal spray; Spray 1 spray into both nostrils nightly as needed for rhinitis or allergies    BMI:   Estimated body mass index is 28.08 kg/m  as calculated from the following:    Height as of 3/1/23: 1.702 m (5' 7\").    Weight as of 3/1/23: 81.3 kg (179 lb 4.8 oz).   Weight management plan: Discussed healthy diet and exercise guidelines    Work on weight loss  Regular exercise      Subjective   Danay is a 67 year old, presenting for the following health issues:  URI (Prolonged)  Patient been sick with sinus congestion, cough.  She was traveling to [place] where she got sick.  She has no wheezing, no difficulty breathing, no chest pain.  She has no fever no chills.  She has 2 negative COVID test.        5/26/2023    10:24 AM   Additional Questions   Roomed by Yrn Ho CMA   Accompanied by Virtual         5/26/2023    10:24 AM   Patient Reported Additional Medications   Patient reports taking the following new medications No     URI  This is a new problem. The current episode started 1 to 4 weeks ago. The problem occurs daily. The problem has been unchanged. Associated symptoms include congestion, coughing, headaches and a sore throat. Nothing aggravates the symptoms. She has tried rest (Ibuprofen) for the symptoms. The treatment provided moderate relief.   History of Present Illness       Reason for visit:  Prolonged cold    She eats 4 or more servings of fruits and vegetables daily.She consumes 2 sweetened " beverage(s) daily.She exercises with enough effort to increase her heart rate 30 to 60 minutes per day.  She exercises with enough effort to increase her heart rate 7 days per week.   She is taking medications regularly.       Review of Systems   HENT: Positive for congestion and sore throat.    Respiratory: Positive for cough.    Neurological: Positive for headaches.      Constitutional, HEENT, cardiovascular, pulmonary, GI, , musculoskeletal, neuro, skin, endocrine and psych systems are negative, except as otherwise noted.      Objective           Vitals:  No vitals were obtained today due to virtual visit.    Physical Exam   GENERAL: Healthy, alert and no distress  EYES: Eyes grossly normal to inspection.  No discharge or erythema, or obvious scleral/conjunctival abnormalities.  RESP: No audible wheeze, cough, or visible cyanosis.  No visible retractions or increased work of breathing.    SKIN: Visible skin clear. No significant rash, abnormal pigmentation or lesions.  NEURO: Cranial nerves grossly intact.  Mentation and speech appropriate for age.  PSYCH: Mentation appears normal, affect normal/bright, judgement and insight intact, normal speech and appearance well-groomed.    Negative Home COVID test, X2    Salvador Thurston MD        Video-Visit Details    Type of service:  Video Visit   Video Start Time: 10:57 AM  Video End Time:11:05 AM    Originating Location (pt. Location): Home  Distant Location (provider location):  On-site  Platform used for Video Visit: Freezing Point

## 2023-11-21 ENCOUNTER — OFFICE VISIT (OUTPATIENT)
Dept: FAMILY MEDICINE | Facility: CLINIC | Age: 68
End: 2023-11-21
Payer: MEDICARE

## 2023-11-21 VITALS
HEART RATE: 81 BPM | DIASTOLIC BLOOD PRESSURE: 72 MMHG | TEMPERATURE: 97.5 F | BODY MASS INDEX: 27.18 KG/M2 | RESPIRATION RATE: 16 BRPM | HEIGHT: 67 IN | WEIGHT: 173.2 LBS | OXYGEN SATURATION: 98 % | SYSTOLIC BLOOD PRESSURE: 110 MMHG

## 2023-11-21 DIAGNOSIS — J01.01 ACUTE RECURRENT MAXILLARY SINUSITIS: Primary | ICD-10-CM

## 2023-11-21 DIAGNOSIS — Z29.11 NEED FOR VACCINATION AGAINST RESPIRATORY SYNCYTIAL VIRUS: ICD-10-CM

## 2023-11-21 PROCEDURE — 99213 OFFICE O/P EST LOW 20 MIN: CPT | Performed by: PHYSICIAN ASSISTANT

## 2023-11-21 ASSESSMENT — ENCOUNTER SYMPTOMS
SINUS PRESSURE: 1
RHINORRHEA: 1
COUGH: 1
ACTIVITY CHANGE: 1
CHEST TIGHTNESS: 0
SHORTNESS OF BREATH: 0
FEVER: 1
SINUS PAIN: 1

## 2023-11-21 NOTE — PROGRESS NOTES
"  Assessment & Plan     Need for vaccination against respiratory syncytial virus  Get at pharmacy    Acute recurrent maxillary sinusitis  Push fluids steam self she should slowly steadily improve not acutely worsen or we will recheck  - amoxicillin-clavulanate (AUGMENTIN) 875-125 MG tablet  Dispense: 20 tablet; Refill: 0               BMI:   Estimated body mass index is 27.13 kg/m  as calculated from the following:    Height as of this encounter: 1.702 m (5' 7\").    Weight as of this encounter: 78.6 kg (173 lb 3.2 oz).           TAMIKO Ahuja  [facility]    Marian Jon is a 68 year old, presenting for the following health issues:  Cough (Very deep productive cough for a week )        11/21/2023    11:09 AM   Additional Questions   Roomed by SD Baird       68-year-old presents the clinic for 2-week history of URI symptoms last week she thought she was developing sinusitis but then the symptoms improved temporarily but now her cough is worse and it settled in her chest she is able to cough up some mucopurulent sputum she has some blood-tinged rhinorrhea  She had a fever a little over 100 last evening  She has known drug allergies  She has no allergic rhinitis    History of Present Illness       Reason for visit:  Persistent cough  Symptom onset:  1-2 weeks ago  Symptoms include:  Persistent cough, lots of pflegm, blowing my nose, headache, tired.  Symptom intensity:  Moderate  Symptom progression:  Staying the same  Had these symptoms before:  No  What makes it worse:  No  What makes it better:  No    She eats 4 or more servings of fruits and vegetables daily.She consumes 0 sweetened beverage(s) daily.She exercises with enough effort to increase her heart rate 60 or more minutes per day.  She exercises with enough effort to increase her heart rate 6 days per week.                  Review of Systems   Constitutional:  Positive for activity change and fever.   HENT:  Positive " "for congestion, rhinorrhea, sinus pressure and sinus pain.    Respiratory:  Positive for cough. Negative for chest tightness and shortness of breath.             Objective    /72 (BP Location: Right arm, Patient Position: Sitting, Cuff Size: Adult Regular)   Pulse 81   Temp 97.5  F (36.4  C) (Tympanic)   Resp 16   Ht 1.702 m (5' 7\")   Wt 78.6 kg (173 lb 3.2 oz)   SpO2 98%   BMI 27.13 kg/m    Body mass index is 27.13 kg/m .  Physical Exam attentive no acute distress  Ears canals and drums normal  Conjunctiva pink  Nasal mucosa is inflamed congested purulent rhinorrhea is noted with blood-tinged noted on the left  Oropharynx mild injection no exudate no postnasal drip  Neck supple no adenopathy  Lungs some coarse rhonchi but good air exchange no wheeze or Rales  Cardiovascular regular rate and rhythm                        "

## 2024-01-02 ENCOUNTER — MYC MEDICAL ADVICE (OUTPATIENT)
Dept: FAMILY MEDICINE | Facility: CLINIC | Age: 69
End: 2024-01-02
Payer: MEDICARE

## 2024-01-02 DIAGNOSIS — H10.32 ACUTE BACTERIAL CONJUNCTIVITIS OF LEFT EYE: Primary | ICD-10-CM

## 2024-01-02 RX ORDER — CIPROFLOXACIN HYDROCHLORIDE 3.5 MG/ML
1-2 SOLUTION/ DROPS TOPICAL EVERY 4 HOURS
Qty: 5 ML | Refills: 1 | Status: SHIPPED | OUTPATIENT
Start: 2024-01-02 | End: 2024-04-02

## 2024-03-10 ENCOUNTER — HEALTH MAINTENANCE LETTER (OUTPATIENT)
Age: 69
End: 2024-03-10

## 2024-04-01 SDOH — HEALTH STABILITY: PHYSICAL HEALTH: ON AVERAGE, HOW MANY DAYS PER WEEK DO YOU ENGAGE IN MODERATE TO STRENUOUS EXERCISE (LIKE A BRISK WALK)?: 7 DAYS

## 2024-04-01 SDOH — HEALTH STABILITY: PHYSICAL HEALTH: ON AVERAGE, HOW MANY MINUTES DO YOU ENGAGE IN EXERCISE AT THIS LEVEL?: 60 MIN

## 2024-04-01 ASSESSMENT — SOCIAL DETERMINANTS OF HEALTH (SDOH): HOW OFTEN DO YOU GET TOGETHER WITH FRIENDS OR RELATIVES?: MORE THAN THREE TIMES A WEEK

## 2024-04-02 ENCOUNTER — OFFICE VISIT (OUTPATIENT)
Dept: FAMILY MEDICINE | Facility: CLINIC | Age: 69
End: 2024-04-02
Payer: MEDICARE

## 2024-04-02 VITALS
OXYGEN SATURATION: 98 % | BODY MASS INDEX: 28.25 KG/M2 | RESPIRATION RATE: 16 BRPM | TEMPERATURE: 96.7 F | DIASTOLIC BLOOD PRESSURE: 78 MMHG | HEART RATE: 67 BPM | HEIGHT: 67 IN | SYSTOLIC BLOOD PRESSURE: 132 MMHG | WEIGHT: 180 LBS

## 2024-04-02 DIAGNOSIS — Z00.00 MEDICARE ANNUAL WELLNESS VISIT, SUBSEQUENT: ICD-10-CM

## 2024-04-02 DIAGNOSIS — Z11.59 NEED FOR HEPATITIS C SCREENING TEST: Primary | ICD-10-CM

## 2024-04-02 DIAGNOSIS — Z13.220 ENCOUNTER FOR LIPID SCREENING FOR CARDIOVASCULAR DISEASE: ICD-10-CM

## 2024-04-02 DIAGNOSIS — Z29.11 NEED FOR VACCINATION AGAINST RESPIRATORY SYNCYTIAL VIRUS: ICD-10-CM

## 2024-04-02 DIAGNOSIS — Z13.1 SCREENING FOR DIABETES MELLITUS: ICD-10-CM

## 2024-04-02 DIAGNOSIS — Z13.6 ENCOUNTER FOR LIPID SCREENING FOR CARDIOVASCULAR DISEASE: ICD-10-CM

## 2024-04-02 LAB
ANION GAP SERPL CALCULATED.3IONS-SCNC: 12 MMOL/L (ref 7–15)
BUN SERPL-MCNC: 24.7 MG/DL (ref 8–23)
CALCIUM SERPL-MCNC: 9.3 MG/DL (ref 8.8–10.2)
CHLORIDE SERPL-SCNC: 106 MMOL/L (ref 98–107)
CHOLEST SERPL-MCNC: 194 MG/DL
CREAT SERPL-MCNC: 0.68 MG/DL (ref 0.51–0.95)
DEPRECATED HCO3 PLAS-SCNC: 23 MMOL/L (ref 22–29)
EGFRCR SERPLBLD CKD-EPI 2021: >90 ML/MIN/1.73M2
ERYTHROCYTE [DISTWIDTH] IN BLOOD BY AUTOMATED COUNT: 14.7 % (ref 10–15)
FASTING STATUS PATIENT QL REPORTED: ABNORMAL
GLUCOSE SERPL-MCNC: 96 MG/DL (ref 70–99)
HCT VFR BLD AUTO: 40.9 % (ref 35–47)
HCV AB SERPL QL IA: NONREACTIVE
HDLC SERPL-MCNC: 67 MG/DL
HGB BLD-MCNC: 12.7 G/DL (ref 11.7–15.7)
LDLC SERPL CALC-MCNC: 106 MG/DL
MCH RBC QN AUTO: 24.7 PG (ref 26.5–33)
MCHC RBC AUTO-ENTMCNC: 31.1 G/DL (ref 31.5–36.5)
MCV RBC AUTO: 79 FL (ref 78–100)
NONHDLC SERPL-MCNC: 127 MG/DL
PLATELET # BLD AUTO: 287 10E3/UL (ref 150–450)
POTASSIUM SERPL-SCNC: 4.1 MMOL/L (ref 3.4–5.3)
RBC # BLD AUTO: 5.15 10E6/UL (ref 3.8–5.2)
SODIUM SERPL-SCNC: 141 MMOL/L (ref 135–145)
TRIGL SERPL-MCNC: 107 MG/DL
WBC # BLD AUTO: 4.8 10E3/UL (ref 4–11)

## 2024-04-02 PROCEDURE — 85027 COMPLETE CBC AUTOMATED: CPT | Performed by: FAMILY MEDICINE

## 2024-04-02 PROCEDURE — G0438 PPPS, INITIAL VISIT: HCPCS | Performed by: FAMILY MEDICINE

## 2024-04-02 PROCEDURE — 36415 COLL VENOUS BLD VENIPUNCTURE: CPT | Performed by: FAMILY MEDICINE

## 2024-04-02 PROCEDURE — 80061 LIPID PANEL: CPT | Performed by: FAMILY MEDICINE

## 2024-04-02 PROCEDURE — 86803 HEPATITIS C AB TEST: CPT | Performed by: FAMILY MEDICINE

## 2024-04-02 PROCEDURE — 80048 BASIC METABOLIC PNL TOTAL CA: CPT | Performed by: FAMILY MEDICINE

## 2024-04-02 RX ORDER — RESPIRATORY SYNCYTIAL VIRUS VACCINE 120MCG/0.5
0.5 KIT INTRAMUSCULAR ONCE
Qty: 1 EACH | Refills: 0 | Status: SHIPPED | OUTPATIENT
Start: 2024-04-02 | End: 2024-04-02

## 2024-04-02 NOTE — PROGRESS NOTES
"Preventive Care Visit  [facility]  Wilman Cyr MD, Family Medicine  Apr 2, 2024      Assessment & Plan     Need for vaccination against respiratory syncytial virus  - respiratory syncytial virus vaccine, bivalent (ABRYSVO) injection; Inject 0.5 mLs into the muscle once for 1 dose    Need for hepatitis C screening test  - Hepatitis C Screen Reflex to HCV RNA Quant and Genotype; Future    Encounter for lipid screening for cardiovascular disease  - Lipid panel reflex to direct LDL Non-fasting; Future    Screening for diabetes mellitus  - CBC with platelets; Future  - Basic metabolic panel  (Ca, Cl, CO2, Creat, Gluc, K, Na, BUN); Future    Medicare annual wellness visit, subsequent        BMI  Estimated body mass index is 28.19 kg/m  as calculated from the following:    Height as of this encounter: 1.702 m (5' 7\").    Weight as of this encounter: 81.6 kg (180 lb).       Counseling  Appropriate preventive services were discussed with this patient, including applicable screening as appropriate for fall prevention, nutrition, physical activity, Tobacco-use cessation, weight loss and cognition.  Checklist reviewing preventive services available has been given to the patient.  Reviewed patient's diet, addressing concerns and/or questions.   Discussed possible causes of fatigue.         Marian Jon is a 68 year old, presenting for the following:  Physical (AWV)        4/2/2024     7:39 AM   Additional Questions   Roomed by ciaran   Accompanied by n/a         Health Care Directive  Patient does not have a Health Care Directive or Living Will: Patient states has Advance Directive and will bring in a copy to clinic.    HPI  Annual Exam          4/1/2024   General Health   How would you rate your overall physical health? Good   Feel stress (tense, anxious, or unable to sleep) To some extent   (!) STRESS CONCERN      4/1/2024   Nutrition   Diet: Vegetarian/vegan         4/1/2024 "   Exercise   Days per week of moderate/strenous exercise 7 days   Average minutes spent exercising at this level 60 min         4/1/2024   Social Factors   Frequency of gathering with friends or relatives More than three times a week   Worry food won't last until get money to buy more No   Food not last or not have enough money for food? No   Do you have housing?  Yes   Are you worried about losing your housing? No   Lack of transportation? No   Unable to get utilities (heat,electricity)? No         4/1/2024   Fall Risk   Fallen 2 or more times in the past year? No   Trouble with walking or balance? No          4/1/2024   Activities of Daily Living- Home Safety   Needs help with the following daily activites None of the above   Safety concerns in the home None of the above         4/1/2024   Dental   Dentist two times every year? Yes         4/1/2024   Hearing Screening   Hearing concerns? None of the above         4/1/2024   Driving Risk Screening   Patient/family members have concerns about driving No         4/1/2024   General Alertness/Fatigue Screening   Have you been more tired than usual lately? (!) YES         4/1/2024   Urinary Incontinence Screening   Bothered by leaking urine in past 6 months No         4/1/2024   TB Screening   Were you born outside of the [place]? No         Today's PHQ-2 Score:       4/1/2024     1:55 PM   PHQ-2 ( 1999 Pfizer)   Q1: Little interest or pleasure in doing things 0   Q2: Feeling down, depressed or hopeless 0   PHQ-2 Score 0   Q1: Little interest or pleasure in doing things Not at all   Q2: Feeling down, depressed or hopeless Not at all   PHQ-2 Score 0           4/1/2024   Substance Use   Alcohol more than 3/day or more than 7/wk Not Applicable   Do you have a current opioid prescription? No   How severe/bad is pain from 1 to 10? 0/10 (No Pain)   Do you use any other substances recreationally? No     Social History     Tobacco Use    Smoking status: Never     Passive exposure:  Never    Smokeless tobacco: Never   Vaping Use    Vaping Use: Never used   Substance Use Topics    Alcohol use: Yes     Comment: Very rarely    Drug use: Never           2/6/2023   LAST FHS-7 RESULTS   1st degree relative breast or ovarian cancer No   Any relative bilateral breast cancer No   Any male have breast cancer No   Any ONE woman have BOTH breast AND ovarian cancer No   Any woman with breast cancer before 50yrs No   2 or more relatives with breast AND/OR ovarian cancer No   2 or more relatives with breast AND/OR bowel cancer No        Mammogram Screening - Mammogram every 1-2 years updated in Health Maintenance based on mutual decision making    ASCVD Risk   The ASCVD Risk score (Eve LEAL, et al., 2019) failed to calculate for the following reasons:    Cannot find a previous HDL lab    Cannot find a previous total cholesterol lab          Reviewed and updated as needed this visit by Provider                      Current providers sharing in care for this patient include:  Patient Care Team:  Wilman Cyr MD as PCP - General (Family Practice)  Feliciano Bee MD as Assigned Heart and Vascular Provider  Nolberto Alarcon PA as Assigned PCP    The following health maintenance items are reviewed in Epic and correct as of today:  Health Maintenance   Topic Date Due    ADVANCE CARE PLANNING  Never done    HEPATITIS C SCREENING  Never done    IPV IMMUNIZATION (2 of 3 - Adult catch-up series) 07/21/2000    RSV VACCINE (Pregnancy & 60+) (1 - 1-dose 60+ series) Never done    MEDICARE ANNUAL WELLNESS VISIT  Never done    Pneumococcal Vaccine: 65+ Years (1 of 1 - PCV) Never done    LIPID  07/27/2023    INFLUENZA VACCINE (1) 09/01/2023    COVID-19 Vaccine (6 - 2023-24 season) 09/01/2023    GLUCOSE  06/16/2024    ANNUAL REVIEW OF HM ORDERS  11/21/2024    MAMMO SCREENING  02/06/2025    DTAP/TDAP/TD IMMUNIZATION (2 - Td or Tdap) 03/31/2025    FALL RISK ASSESSMENT  04/02/2025    COLORECTAL CANCER  "SCREENING  04/26/2026    DEXA  01/16/2034    PHQ-2 (once per calendar year)  Completed    ZOSTER IMMUNIZATION  Completed    HPV IMMUNIZATION  Aged Out    MENINGITIS IMMUNIZATION  Aged Out    RSV MONOCLONAL ANTIBODY  Aged Out    PAP  Discontinued            Objective    Exam  /78 (BP Location: Right arm, Patient Position: Sitting)   Pulse 67   Temp (!) 96.7  F (35.9  C) (Tympanic)   Resp 16   Ht 1.702 m (5' 7\")   Wt 81.6 kg (180 lb)   LMP  (LMP Unknown)   SpO2 98%   BMI 28.19 kg/m     Estimated body mass index is 28.19 kg/m  as calculated from the following:    Height as of this encounter: 1.702 m (5' 7\").    Weight as of this encounter: 81.6 kg (180 lb).    Physical Exam  GENERAL: alert and no distress  NECK: no adenopathy, no asymmetry, masses, or scars  RESP: lungs clear to auscultation - no rales, rhonchi or wheezes  CV: regular rate and rhythm, normal S1 S2, no S3 or S4, no murmur, click or rub, no peripheral edema  ABDOMEN: soft, nontender, no hepatosplenomegaly, no masses and bowel sounds normal  MS: no gross musculoskeletal defects noted, no edema        4/2/2024   Mini Cog   Clock Draw Score 2 Normal   3 Item Recall 3 objects recalled   Mini Cog Total Score 5             4/2/2024   Vision Screen   Vision Screen Results Pass       Signed Electronically by: Wilman Cyr MD    "

## 2024-10-24 ENCOUNTER — ANCILLARY PROCEDURE (OUTPATIENT)
Dept: MAMMOGRAPHY | Facility: CLINIC | Age: 69
End: 2024-10-24
Attending: FAMILY MEDICINE
Payer: MEDICARE

## 2024-10-24 DIAGNOSIS — Z12.31 VISIT FOR SCREENING MAMMOGRAM: ICD-10-CM

## 2024-10-24 PROCEDURE — 77063 BREAST TOMOSYNTHESIS BI: CPT | Mod: TC | Performed by: RADIOLOGY

## 2024-10-24 PROCEDURE — 77067 SCR MAMMO BI INCL CAD: CPT | Mod: TC | Performed by: RADIOLOGY

## 2025-04-02 ENCOUNTER — MYC MEDICAL ADVICE (OUTPATIENT)
Dept: FAMILY MEDICINE | Facility: CLINIC | Age: 70
End: 2025-04-02
Payer: MEDICARE

## 2025-04-16 ENCOUNTER — OFFICE VISIT (OUTPATIENT)
Dept: CARDIOLOGY | Facility: CLINIC | Age: 70
End: 2025-04-16
Payer: COMMERCIAL

## 2025-04-16 VITALS
WEIGHT: 168 LBS | BODY MASS INDEX: 26.31 KG/M2 | DIASTOLIC BLOOD PRESSURE: 82 MMHG | HEART RATE: 64 BPM | SYSTOLIC BLOOD PRESSURE: 145 MMHG | RESPIRATION RATE: 14 BRPM

## 2025-04-16 DIAGNOSIS — R00.0 TACHYCARDIA: Primary | ICD-10-CM

## 2025-04-16 PROCEDURE — 99214 OFFICE O/P EST MOD 30 MIN: CPT | Performed by: INTERNAL MEDICINE

## 2025-04-16 PROCEDURE — 3077F SYST BP >= 140 MM HG: CPT | Performed by: INTERNAL MEDICINE

## 2025-04-16 PROCEDURE — 3079F DIAST BP 80-89 MM HG: CPT | Performed by: INTERNAL MEDICINE

## 2025-04-16 NOTE — PROGRESS NOTES
[facility]  [phone]          Assessment/Recommendations   Patient with recurrent episodes of tachycardia which I suspect are SVT but we have not been able to record any episodes.  She had an episode recently that was about an hour in duration but did not go into the clinic and was able to break it with 16 seconds of Valsalva.  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  Would like to see a rhythm strip and she is willing to get a Kardia device so that she could send me a strip when this happens again.  I will also offered her an appointment with one of our electrophysiologist but she is not keen on that just yet and would like to catch the rhythm disturbance and see what it is.    Previous imaging was unremarkable and I do not think we need to repeat any cardiac testing right now prior to seeing what rhythm she has when she experiences her tachycardia.    It has been my honor to take care of Danay Galdamez.       History of Present Illness/Subjective    Ms. Danay Galdamez is a 69 year old female with episodes of tachycardia.  She has about 2, sometimes 3 a year.  The most recent 1 was one of the longer episodes.  She got out of a car and felt very lightheaded then noticed that her heart was racing.  Heart was going about 160 bpm which she coded by checking her carotid artery.  She has not monitored it.  She drove home and was doing Valsalva maneuvers and when she did a 16-second Valsalva maneuver it went away.  The whole episode lasted about an hour.  She gets a little lightheaded with it but she is able to navigate.  She does is never had syncopal episode denies chest discomfort, orthopnea, paroxysmal nocturnal dyspnea, peripheral edema.       Physical Examination Review of Systems   BP (!) 145/82 (BP Location: Right arm, Patient Position: Sitting, Cuff Size: Adult Large)   Pulse 64   Resp 14   Wt 76.2 kg (168 lb)   LMP  (LMP  Unknown)   BMI 26.31 kg/m    Body mass index is 26.31 kg/m .  Wt Readings from Last 3 Encounters:   04/16/25 76.2 kg (168 lb)   04/02/24 81.6 kg (180 lb)   11/21/23 78.6 kg (173 lb 3.2 oz)     General Appearance:   Alert, cooperative and in no acute distress.   ENT/Mouth: Pink/moist oral mucosa   EYES:  no scleral icterus, normal conjunctivae   Neck: JVP normal. No Hepatojugular reflux. Thyroid not visualized.   Chest/Lungs:   Lungs are clear to auscultation, equal chest wall expansion.   Cardiovascular:   S1, S2 without murmur ,clicks or rubs. Brachial, radial and posterior tibial pulses are intact and symetric. No carotid bruits noted   Abdomen:  Nontender.    Extremities: No cyanosis, clubbing or edema   Skin: no xanthelasma, warm.    Neurologic: normal arm movement bilateral, no tremors     Psychiatric: Appropriate affect.      Encounter Vitals  BP: (!) 145/82  Pulse: 64  Resp: 14  Weight: 76.2 kg (168 lb)                                           Medical History  Surgical History Family History Social History   No past medical history on file. No past surgical history on file. No family history on file. Social History     Socioeconomic History    Marital status:      Spouse name: Not on file    Number of children: Not on file    Years of education: Not on file    Highest education level: Not on file   Occupational History    Not on file   Tobacco Use    Smoking status: Never     Passive exposure: Never    Smokeless tobacco: Never   Vaping Use    Vaping status: Never Used   Substance and Sexual Activity    Alcohol use: Yes     Comment: Very rarely    Drug use: Never    Sexual activity: Yes     Partners: Male   Other Topics Concern    Not on file   Social History Narrative    Not on file     Social Drivers of Health     Financial Resource Strain: Low Risk  (4/1/2024)    Financial Resource Strain     Within the past 12 months, have you or your family members you live with been unable to get utilities  (heat, electricity) when it was really needed?: No   Food Insecurity: Low Risk  (4/1/2024)    Food Insecurity     Within the past 12 months, did you worry that your food would run out before you got money to buy more?: No     Within the past 12 months, did the food you bought just not last and you didn t have money to get more?: No   Transportation Needs: Low Risk  (4/1/2024)    Transportation Needs     Within the past 12 months, has lack of transportation kept you from medical appointments, getting your medicines, non-medical meetings or appointments, work, or from getting things that you need?: No   Physical Activity: Sufficiently Active (4/1/2024)    Exercise Vital Sign     Days of Exercise per Week: 7 days     Minutes of Exercise per Session: 60 min   Stress: Stress Concern Present (4/1/2024)    [employer] - Occupational Stress Questionnaire     Feeling of Stress : To some extent   Social Connections: Unknown (4/1/2024)    Social Connection and Isolation Panel [NHANES]     Frequency of Communication with Friends and Family: Not on file     Frequency of Social Gatherings with Friends and Family: More than three times a week     Attends [employer] Services: Not on file     Active Member of Clubs or Organizations: Not on file     Attends Club or Organization Meetings: Not on file     Marital Status: Not on file   Interpersonal Safety: Low Risk  (4/2/2024)    Interpersonal Safety     Do you feel physically and emotionally safe where you currently live?: Yes     Within the past 12 months, have you been hit, slapped, kicked or otherwise physically hurt by someone?: No     Within the past 12 months, have you been humiliated or emotionally abused in other ways by your partner or ex-partner?: No   Housing Stability: Low Risk  (4/1/2024)    Housing Stability     Do you have housing? : Yes     Are you worried about losing your housing?: No          Medications  Allergies   No current outpatient  medications on file.    No Known Allergies      Lab Results    Chemistry/lipid CBC Cardiac Enzymes/BNP/TSH/INR   Lab Results   Component Value Date    CHOL 194 04/02/2024    HDL 67 04/02/2024    TRIG 107 04/02/2024    BUN 24.7 (H) 04/02/2024     04/02/2024    CO2 23 04/02/2024    Lab Results   Component Value Date    WBC 4.8 04/02/2024    HGB 12.7 04/02/2024    HCT 40.9 04/02/2024    MCV 79 04/02/2024     04/02/2024    Lab Results   Component Value Date    TSH 1.98 06/16/2021

## 2025-04-16 NOTE — LETTER
4/16/2025    Wilman Cyr MD  [address] [facility] [address]    RE: Danay Galdamez       Dear Colleague,     I had the pleasure of seeing Danay Galdamez in the [facility].      [facility]  [phone]          Assessment/Recommendations   Patient with recurrent episodes of tachycardia which I suspect are SVT but we have not been able to record any episodes.  She had an episode recently that was about an hour in duration but did not go into the clinic and was able to break it with 16 seconds of Valsalva.  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  Would like to see a rhythm strip and she is willing to get a Lecorpiodia device so that she could send me a strip when this happens again.  I will also offered her an appointment with one of our electrophysiologist but she is not keen on that just yet and would like to catch the rhythm disturbance and see what it is.    Previous imaging was unremarkable and I do not think we need to repeat any cardiac testing right now prior to seeing what rhythm she has when she experiences her tachycardia.    It has been my honor to take care of Danay Galdamez.       History of Present Illness/Subjective    Ms. Danay Galdamez is a 69 year old female with episodes of tachycardia.  She has about 2, sometimes 3 a year.  The most recent 1 was one of the longer episodes.  She got out of a car and felt very lightheaded then noticed that her heart was racing.  Heart was going about 160 bpm which she coded by checking her carotid artery.  She has not monitored it.  She drove home and was doing Valsalva maneuvers and when she did a 16-second Valsalva maneuver it went away.  The whole episode lasted about an hour.  She gets a little lightheaded with it but she is able to navigate.  She does is never had syncopal episode denies chest discomfort, orthopnea, paroxysmal nocturnal dyspnea,  peripheral edema.       Physical Examination Review of Systems   BP (!) 145/82 (BP Location: Right arm, Patient Position: Sitting, Cuff Size: Adult Large)   Pulse 64   Resp 14   Wt 76.2 kg (168 lb)   LMP  (LMP Unknown)   BMI 26.31 kg/m    Body mass index is 26.31 kg/m .  Wt Readings from Last 3 Encounters:   04/16/25 76.2 kg (168 lb)   04/02/24 81.6 kg (180 lb)   11/21/23 78.6 kg (173 lb 3.2 oz)     General Appearance:   Alert, cooperative and in no acute distress.   ENT/Mouth: Pink/moist oral mucosa   EYES:  no scleral icterus, normal conjunctivae   Neck: JVP normal. No Hepatojugular reflux. Thyroid not visualized.   Chest/Lungs:   Lungs are clear to auscultation, equal chest wall expansion.   Cardiovascular:   S1, S2 without murmur ,clicks or rubs. Brachial, radial and posterior tibial pulses are intact and symetric. No carotid bruits noted   Abdomen:  Nontender.    Extremities: No cyanosis, clubbing or edema   Skin: no xanthelasma, warm.    Neurologic: normal arm movement bilateral, no tremors     Psychiatric: Appropriate affect.      Encounter Vitals  BP: (!) 145/82  Pulse: 64  Resp: 14  Weight: 76.2 kg (168 lb)                                           Medical History  Surgical History Family History Social History   No past medical history on file. No past surgical history on file. No family history on file. Social History     Socioeconomic History     Marital status:      Spouse name: Not on file     Number of children: Not on file     Years of education: Not on file     Highest education level: Not on file   Occupational History     Not on file   Tobacco Use     Smoking status: Never     Passive exposure: Never     Smokeless tobacco: Never   Vaping Use     Vaping status: Never Used   Substance and Sexual Activity     Alcohol use: Yes     Comment: Very rarely     Drug use: Never     Sexual activity: Yes     Partners: Male   Other Topics Concern     Not on file   Social History Narrative     Not on  file     Social Drivers of Health     Financial Resource Strain: Low Risk  (4/1/2024)    Financial Resource Strain      Within the past 12 months, have you or your family members you live with been unable to get utilities (heat, electricity) when it was really needed?: No   Food Insecurity: Low Risk  (4/1/2024)    Food Insecurity      Within the past 12 months, did you worry that your food would run out before you got money to buy more?: No      Within the past 12 months, did the food you bought just not last and you didn t have money to get more?: No   Transportation Needs: Low Risk  (4/1/2024)    Transportation Needs      Within the past 12 months, has lack of transportation kept you from medical appointments, getting your medicines, non-medical meetings or appointments, work, or from getting things that you need?: No   Physical Activity: Sufficiently Active (4/1/2024)    Exercise Vital Sign      Days of Exercise per Week: 7 days      Minutes of Exercise per Session: 60 min   Stress: Stress Concern Present (4/1/2024)    [employer] - Occupational Stress Questionnaire      Feeling of Stress : To some extent   Social Connections: Unknown (4/1/2024)    Social Connection and Isolation Panel [NHANES]      Frequency of Communication with Friends and Family: Not on file      Frequency of Social Gatherings with Friends and Family: More than three times a week      Attends Mormonism Services: Not on file      Active Member of Clubs or Organizations: Not on file      Attends Club or Organization Meetings: Not on file      Marital Status: Not on file   Interpersonal Safety: Low Risk  (4/2/2024)    Interpersonal Safety      Do you feel physically and emotionally safe where you currently live?: Yes      Within the past 12 months, have you been hit, slapped, kicked or otherwise physically hurt by someone?: No      Within the past 12 months, have you been humiliated or emotionally abused in other  ways by your partner or ex-partner?: No   Housing Stability: Low Risk  (4/1/2024)    Housing Stability      Do you have housing? : Yes      Are you worried about losing your housing?: No          Medications  Allergies   No current outpatient medications on file.    No Known Allergies      Lab Results    Chemistry/lipid CBC Cardiac Enzymes/BNP/TSH/INR   Lab Results   Component Value Date    CHOL 194 04/02/2024    HDL 67 04/02/2024    TRIG 107 04/02/2024    BUN 24.7 (H) 04/02/2024     04/02/2024    CO2 23 04/02/2024    Lab Results   Component Value Date    WBC 4.8 04/02/2024    HGB 12.7 04/02/2024    HCT 40.9 04/02/2024    MCV 79 04/02/2024     04/02/2024    Lab Results   Component Value Date    TSH 1.98 06/16/2021                                                Thank you for allowing me to participate in the care of your patient.      Sincerely,     Feliciano Bee MD     [facility]  cc:   Feliciano Bee MD  [address]  [place] [address]

## 2025-05-18 ENCOUNTER — HEALTH MAINTENANCE LETTER (OUTPATIENT)
Age: 70
End: 2025-05-18

## 2025-06-29 ENCOUNTER — MYC MEDICAL ADVICE (OUTPATIENT)
Dept: CARDIOLOGY | Facility: CLINIC | Age: 70
End: 2025-06-29
Payer: COMMERCIAL

## 2025-06-30 NOTE — TELEPHONE ENCOUNTER
From: Feliciano Bee MD  Sent: 6/30/2025  12:26 PM CDT  To: Kenzie Taveras  Subject: FW: Pk EKG                                   Kenzie,    This looks like SVT with a heart rate of 170.  Would offer a consult with our electrophysiologist.  Seeing them does not sign her up for procedure but would get her talked into their program in case   she wants to consider it now or in the future.    Thanks,    Feliciano

## 2025-07-09 ENCOUNTER — TELEPHONE (OUTPATIENT)
Dept: FAMILY MEDICINE | Facility: CLINIC | Age: 70
End: 2025-07-09
Payer: COMMERCIAL

## 2025-07-09 NOTE — TELEPHONE ENCOUNTER
Patient Quality Outreach    Patient is due for the following:   Physical Annual Wellness Visit    Action(s) Taken:   Patient has upcoming appointment, these items will be addressed at that time.    Type of outreach:    Chart review performed, no outreach needed.    Questions for provider review:    None         Courtney Wall MA  Chart routed to None.

## 2025-07-27 SDOH — HEALTH STABILITY: PHYSICAL HEALTH: ON AVERAGE, HOW MANY DAYS PER WEEK DO YOU ENGAGE IN MODERATE TO STRENUOUS EXERCISE (LIKE A BRISK WALK)?: 7 DAYS

## 2025-07-27 SDOH — HEALTH STABILITY: PHYSICAL HEALTH: ON AVERAGE, HOW MANY MINUTES DO YOU ENGAGE IN EXERCISE AT THIS LEVEL?: 90 MIN

## 2025-07-27 ASSESSMENT — SOCIAL DETERMINANTS OF HEALTH (SDOH): HOW OFTEN DO YOU GET TOGETHER WITH FRIENDS OR RELATIVES?: MORE THAN THREE TIMES A WEEK

## 2025-07-31 ENCOUNTER — OFFICE VISIT (OUTPATIENT)
Dept: FAMILY MEDICINE | Facility: CLINIC | Age: 70
End: 2025-07-31
Payer: COMMERCIAL

## 2025-07-31 ENCOUNTER — RESULTS FOLLOW-UP (OUTPATIENT)
Dept: FAMILY MEDICINE | Facility: CLINIC | Age: 70
End: 2025-07-31

## 2025-07-31 VITALS
RESPIRATION RATE: 16 BRPM | SYSTOLIC BLOOD PRESSURE: 131 MMHG | TEMPERATURE: 97 F | OXYGEN SATURATION: 98 % | WEIGHT: 162.6 LBS | BODY MASS INDEX: 25.52 KG/M2 | HEART RATE: 68 BPM | DIASTOLIC BLOOD PRESSURE: 84 MMHG | HEIGHT: 67 IN

## 2025-07-31 DIAGNOSIS — Z13.220 ENCOUNTER FOR LIPID SCREENING FOR CARDIOVASCULAR DISEASE: ICD-10-CM

## 2025-07-31 DIAGNOSIS — Z13.29 THYROID DISORDER SCREENING: ICD-10-CM

## 2025-07-31 DIAGNOSIS — Z13.6 SCREENING FOR CARDIOVASCULAR CONDITION: ICD-10-CM

## 2025-07-31 DIAGNOSIS — Z00.00 ENCOUNTER FOR MEDICARE ANNUAL WELLNESS EXAM: Primary | ICD-10-CM

## 2025-07-31 DIAGNOSIS — Z13.1 SCREENING FOR DIABETES MELLITUS: ICD-10-CM

## 2025-07-31 DIAGNOSIS — Z13.1 SCREENING FOR DIABETES MELLITUS (DM): ICD-10-CM

## 2025-07-31 DIAGNOSIS — Z23 NEED FOR VACCINATION: ICD-10-CM

## 2025-07-31 DIAGNOSIS — Z13.6 ENCOUNTER FOR LIPID SCREENING FOR CARDIOVASCULAR DISEASE: ICD-10-CM

## 2025-07-31 LAB
ANION GAP SERPL CALCULATED.3IONS-SCNC: 10 MMOL/L (ref 7–15)
BUN SERPL-MCNC: 23.6 MG/DL (ref 8–23)
CALCIUM SERPL-MCNC: 9 MG/DL (ref 8.8–10.4)
CHLORIDE SERPL-SCNC: 109 MMOL/L (ref 98–107)
CHOLEST SERPL-MCNC: 174 MG/DL
CREAT SERPL-MCNC: 0.69 MG/DL (ref 0.51–0.95)
EGFRCR SERPLBLD CKD-EPI 2021: >90 ML/MIN/1.73M2
ERYTHROCYTE [DISTWIDTH] IN BLOOD BY AUTOMATED COUNT: 17.9 % (ref 10–15)
EST. AVERAGE GLUCOSE BLD GHB EST-MCNC: 114 MG/DL
FASTING STATUS PATIENT QL REPORTED: YES
FASTING STATUS PATIENT QL REPORTED: YES
GLUCOSE SERPL-MCNC: 94 MG/DL (ref 70–99)
HBA1C MFR BLD: 5.6 % (ref 0–5.6)
HCO3 SERPL-SCNC: 25 MMOL/L (ref 22–29)
HCT VFR BLD AUTO: 35.7 % (ref 35–47)
HDLC SERPL-MCNC: 60 MG/DL
HGB BLD-MCNC: 11.2 G/DL (ref 11.7–15.7)
LDLC SERPL CALC-MCNC: 90 MG/DL
MCH RBC QN AUTO: 23 PG (ref 26.5–33)
MCHC RBC AUTO-ENTMCNC: 31.4 G/DL (ref 31.5–36.5)
MCV RBC AUTO: 73 FL (ref 78–100)
NONHDLC SERPL-MCNC: 114 MG/DL
PLATELET # BLD AUTO: 262 10E3/UL (ref 150–450)
POTASSIUM SERPL-SCNC: 4.4 MMOL/L (ref 3.4–5.3)
RBC # BLD AUTO: 4.88 10E6/UL (ref 3.8–5.2)
SODIUM SERPL-SCNC: 144 MMOL/L (ref 135–145)
TRIGL SERPL-MCNC: 119 MG/DL
TSH SERPL DL<=0.005 MIU/L-ACNC: 2.81 UIU/ML (ref 0.3–4.2)
WBC # BLD AUTO: 4.3 10E3/UL (ref 4–11)

## 2025-07-31 PROCEDURE — 84443 ASSAY THYROID STIM HORMONE: CPT | Performed by: FAMILY MEDICINE

## 2025-07-31 PROCEDURE — 82465 ASSAY BLD/SERUM CHOLESTEROL: CPT | Performed by: FAMILY MEDICINE

## 2025-07-31 PROCEDURE — 80048 BASIC METABOLIC PNL TOTAL CA: CPT | Performed by: FAMILY MEDICINE

## 2025-07-31 NOTE — PROGRESS NOTES
"Preventive Care Visit  [facility]  Wilman Cyr MD, Family Medicine  Jul 31, 2025      Assessment & Plan     Encounter for Medicare annual wellness exam      BMI  Estimated body mass index is 25.47 kg/m  as calculated from the following:    Height as of this encounter: 1.702 m (5' 7\").    Weight as of this encounter: 73.8 kg (162 lb 9.6 oz).       Counseling  Appropriate preventive services were addressed with this patient via screening, questionnaire, or discussion as appropriate for fall prevention, nutrition, physical activity, Tobacco-use cessation, social engagement, weight loss and cognition.  Checklist reviewing preventive services available has been given to the patient.  Reviewed patient's diet, addressing concerns and/or questions.   She is at risk for psychosocial distress and has been provided with information to reduce risk.   Reviewed preventive health counseling, as reflected in patient instructions      Subjective   Danay is a 69 year old, presenting for the following:  Medicare Visit (AWV)        7/31/2025     8:15 AM   Additional Questions   Roomed by Martinez HUANG     Advance Care Planning    Patient states has Health Care Directive and will send to [facility].        7/27/2025   General Health   How would you rate your overall physical health? Excellent   Feel stress (tense, anxious, or unable to sleep) To some extent   (!) STRESS CONCERN      7/27/2025   Nutrition   Diet: Vegetarian/vegan         7/27/2025   Exercise   Days per week of moderate/strenous exercise 7 days   Average minutes spent exercising at this level 90 min         7/27/2025   Social Factors   Frequency of gathering with friends or relatives More than three times a week   Worry food won't last until get money to buy more No   Food not last or not have enough money for food? No   Do you have housing? (Housing is defined as stable permanent housing and does not include staying " outside in a car, in a tent, in an abandoned building, in an overnight shelter, or couch-surfing.) Yes   Are you worried about losing your housing? No   Lack of transportation? No   Unable to get utilities (heat,electricity)? No         7/27/2025   Fall Risk   Fallen 2 or more times in the past year? No   Trouble with walking or balance? No          7/27/2025   Activities of Daily Living- Home Safety   Needs help with the following daily activites None of the above   Safety concerns in the home None of the above         7/27/2025   Dental   Dentist two times every year? Yes         7/27/2025   Hearing Screening   Hearing concerns? None of the above         7/27/2025   Driving Risk Screening   Patient/family members have concerns about driving No         7/27/2025   General Alertness/Fatigue Screening   Have you been more tired than usual lately? No         7/27/2025   Urinary Incontinence Screening   Bothered by leaking urine in past 6 months No         Today's PHQ-2 Score:       7/31/2025     8:10 AM   PHQ-2 ( 1999 Pfizer)   Q1: Little interest or pleasure in doing things 0   Q2: Feeling down, depressed or hopeless 0   PHQ-2 Score 0    Q1: Little interest or pleasure in doing things Not at all   Q2: Feeling down, depressed or hopeless Not at all   PHQ-2 Score 0       Patient-reported           7/27/2025   Substance Use   Alcohol more than 3/day or more than 7/wk No   Do you have a current opioid prescription? No   How severe/bad is pain from 1 to 10? 0/10 (No Pain)   Do you use any other substances recreationally? No     Social History     Tobacco Use    Smoking status: Never     Passive exposure: Never    Smokeless tobacco: Never   Vaping Use    Vaping status: Never Used   Substance Use Topics    Alcohol use: Yes     Comment: Rarely.    Drug use: Never           10/24/2024   LAST FHS-7 RESULTS   1st degree relative breast or ovarian cancer No   Any relative bilateral breast cancer No   Any male have breast cancer  No   Any ONE woman have BOTH breast AND ovarian cancer No   Any woman with breast cancer before 50yrs No   2 or more relatives with breast AND/OR ovarian cancer No   2 or more relatives with breast AND/OR bowel cancer No        Mammogram Screening - Mammogram every 1-2 years updated in Health Maintenance based on mutual decision making      History of abnormal Pap smear: No - age 65 or older with adequate negative prior screening test results (3 consecutive negative cytology results, 2 consecutive negative cotesting results, or 2 consecutive negative HrHPV test results within 10 years, with the most recent test occurring within the recommended screening interval for the test used)       ASCVD Risk   The 10-year ASCVD risk score (Eve LEAL, et al., 2019) is: 6.7%    Values used to calculate the score:      Age: 69 years      Sex: Female      Is Non- : No      Diabetic: No      Tobacco smoker: No      Systolic Blood Pressure: 131 mmHg      Is BP treated: No      HDL Cholesterol: 67 mg/dL      Total Cholesterol: 194 mg/dL            Reviewed and updated as needed this visit by Provider                      Current providers sharing in care for this patient include:  Patient Care Team:  Wilman Cyr MD as PCP - General (Family Practice)  Wilman Cyr MD as Assigned PCP  Feliciano Bee MD as Assigned Heart and Vascular Provider    The following health maintenance items are reviewed in Epic and correct as of today:  Health Maintenance   Topic Date Due    PNEUMOCOCCAL VACCINE 50+ YEARS (1 of 1 - PCV) Never done    COVID-19 VACCINE (7 - 2024-25 season) 09/01/2024    ANNUAL REVIEW OF HM ORDERS  11/21/2024    DTAP/TDAP/TD VACCINE (2 - Td or Tdap) 03/31/2025    MEDICARE ANNUAL WELLNESS VISIT  04/02/2025    INFLUENZA VACCINE (1) 09/01/2025    COLORECTAL CANCER SCREENING  04/26/2026    FALL RISK ASSESSMENT  07/31/2026    MAMMO SCREENING  10/24/2026    DIABETES SCREENING   "04/02/2027    LIPID  04/02/2029    ADVANCE CARE PLANNING  04/02/2029    DEXA  01/16/2034    HEPATITIS C SCREENING  Completed    PHQ-2 (once per calendar year)  Completed    HPV VACCINE (No Doses Required) Completed    ZOSTER VACCINE  Completed    RSV VACCINE  Completed    MENINGITIS VACCINE  Aged Out    PAP  Discontinued            Objective    Exam  /84   Pulse 68   Temp 97  F (36.1  C) (Tympanic)   Resp 16   Ht 1.702 m (5' 7\")   Wt 73.8 kg (162 lb 9.6 oz)   LMP  (LMP Unknown)   SpO2 98%   BMI 25.47 kg/m     Estimated body mass index is 25.47 kg/m  as calculated from the following:    Height as of this encounter: 1.702 m (5' 7\").    Weight as of this encounter: 73.8 kg (162 lb 9.6 oz).    Physical Exam  GENERAL: alert and no distress  NECK: no adenopathy, no asymmetry, masses, or scars  RESP: lungs clear to auscultation - no rales, rhonchi or wheezes  CV: regular rate and rhythm, normal S1 S2, no S3 or S4, no murmur, click or rub, no peripheral edema  ABDOMEN: soft, nontender, no hepatosplenomegaly, no masses and bowel sounds normal  MS: no gross musculoskeletal defects noted, no edema        7/31/2025   Mini Cog   Clock Draw Score 2 Normal   3 Item Recall 3 objects recalled   Mini Cog Total Score 5             4/2/2024   Vision Screen   Vision Screen Results Pass    LEFT EYE 10/8 (20/16)   RIGHT EYE 10/8 (20/16)       Data saved with a previous flowsheet row definition       Signed Electronically by: Wilman Cyr MD    "

## 2025-07-31 NOTE — PATIENT INSTRUCTIONS
"Patient Education   Preventive Care Advice   This is general advice we often give to help people stay healthy. Your care team may have specific advice just for you. Please talk to your care team about your own preventive care needs.  Lifestyle  Exercise at least 150 minutes each week (30 minutes a day, 5 days a week).  Do muscle strengthening activities 2 days a week. These help control your weight and prevent disease.  No smoking.  Wear sunscreen to prevent skin cancer.  Take time with family and friends.  Have your home tested for radon every 2 to 5 years. Radon is a colorless, odorless gas that can harm your lungs. To learn more, go to www.health.[facility].[place].us and search for \"Radon in Homes.\"  Keep guns unloaded and locked up in a safe place like a safe or gun vault, or, use a gun lock and hide the keys. Always lock away bullets separately. To learn more, visit [employer].[place].gov and search for \"safe gun storage.\"  Nutrition  Eat 5 or more servings of fruits and vegetables each day.  Try wheat bread, brown rice and whole grain pasta (instead of white bread, rice, and pasta).  Get enough calcium and vitamin D. Check the label on foods and aim for 100% of the RDA (recommended daily allowance).  Regular exams  Have a dental exam and cleaning every 6 months.  Older adults: Ask your care team how often to have memory testing.  See your health care team every year to talk about:  Any changes in your health.  Any medicines your care team has prescribed.  Preventive care, family planning, and ways to prevent chronic diseases.  Shots (vaccines)   HPV shots (up to age 26), if you've never had them before.  Hepatitis B shots (up to age 59), if you've never had them before.  COVID-19 shot: Get this shot when it's due.  Flu shot: Get a flu shot every year.  Tetanus shot: Get a tetanus shot every 10 years.  Pneumococcal, hepatitis A, and RSV shots: Ask your care team if you need these based on your risk.  Shingles shot (for age 50 and " up).  General health tests  Diabetes screening:  Starting at age 35, Get screened for diabetes at least every 3 years.  If you are younger than age 35, ask your care team if you should be screened for diabetes.  Cholesterol test: At age 39, start having a cholesterol test every 5 years, or more often if advised.  Bone density scan (DEXA): At age 50, ask your care team if you should have this scan for osteoporosis (brittle bones).  Hepatitis C: Get tested at least once in your life.  Abdominal aortic aneurysm screening: Talk to your doctor about having this screening if you:  Have ever smoked; and  Are biologically male; and  Are between the ages of 65 and 75.  STIs (sexually transmitted infections)  Before age 24: Ask your care team if you should be screened for STIs.  After age 24: Get screened for STIs if you're at risk. You are at risk for STIs (including HIV) if:  You are sexually active with more than one person.  You don't use condoms every time.  You or a partner was diagnosed with a sexually transmitted infection.  If you are at risk for HIV, ask about PrEP medicine to prevent HIV.  Get tested for HIV at least once in your life, whether you are at risk for HIV or not.  Cancer screening tests  Cervical cancer screening: If you have a cervix, begin getting regular cervical cancer screening tests at age 21. Most people who have regular screenings with normal results can stop after age 65. Talk about this with your provider.  Breast cancer scan (mammogram): If you've ever had breasts, begin having regular mammograms starting at age 40. This is a scan to check for breast cancer.  Colon cancer screening: It is important to start screening for colon cancer at age 45.  Have a colonoscopy test every 10 years (or more often if you're at risk) Or, ask your provider about stool tests like a FIT test every year or Cologuard test every 3 years.  To learn more about your testing options, visit:  www.[employer]/044002.pdf.  For help making a decision, visit: elsy.andres/dt91701.  Prostate cancer screening test: If you have a prostate and are age 55 to 69, ask your provider if you would benefit from a yearly prostate cancer screening test.  Lung cancer screening: If you are a current or former smoker age 50 to 80, ask your care team if ongoing lung cancer screenings are right for you.    For informational purposes only. Not to replace the advice of your health care provider. Copyright   2023 [facility] [employer]. All rights reserved. Clinically reviewed by the [facility]. Social IQ (Social Influence Quotient) 248997 - REV 6/25.  Learning About Stress  What is stress?     Stress is your body's response to a hard situation. Your body can have a physical, emotional, or mental response. Stress is a fact of life for most people, and it affects everyone differently. What causes stress for you may not be stressful for someone else.  A lot of things can cause stress. You may feel stress when you go on a job interview, take a test, or run a race. This kind of short-term stress is normal and even useful. It can help you if you need to work hard or react quickly. For example, stress can help you finish an important job on time.  Long-term stress is caused by ongoing stressful situations or events. Examples of long-term stress include long-term health problems, ongoing problems at work, or conflicts in your family. Long-term stress can harm your health.  How does stress affect your health?  When you are stressed, your body responds as though you are in danger. It makes hormones that speed up your heart, make you breathe faster, and give you a burst of energy. This is called the fight-or-flight stress response. If the stress is over quickly, your body goes back to normal and no harm is done.  But if stress happens too often or lasts too long, it can have bad effects. Long-term stress can make you more likely to get sick,  and it can make symptoms of some diseases worse. If you tense up when you are stressed, you may develop neck, shoulder, or low back pain. Stress is linked to high blood pressure and heart disease.  Stress also harms your emotional health. It can make you stewart, tense, or depressed. Your relationships may suffer, and you may not do well at work or school.  What can you do to manage stress?  You can try these things to help manage stress:   Do something active. Exercise or activity can help reduce stress. Walking is a great way to get started. Even everyday activities such as housecleaning or yard work can help.  Try yoga or shannon chi. These techniques combine exercise and meditation. You may need some training at first to learn them.  Do something you enjoy. For example, listen to music or go to a movie. Practice your hobby or do volunteer work.  Meditate. This can help you relax, because you are not worrying about what happened before or what may happen in the future.  Do guided imagery. Imagine yourself in any setting that helps you feel calm. You can use online videos, books, or a teacher to guide you.  Do breathing exercises. For example:  From a standing position, bend forward from the waist with your knees slightly bent. Let your arms dangle close to the floor.  Breathe in slowly and deeply as you return to a standing position. Roll up slowly and lift your head last.  Hold your breath for just a few seconds in the standing position.  Breathe out slowly and bend forward from the waist.  Let your feelings out. Talk, laugh, cry, and express anger when you need to. Talking with supportive friends or family, a counselor, or a kim leader about your feelings is a healthy way to relieve stress. Avoid discussing your feelings with people who make you feel worse.  Write. It may help to write about things that are bothering you. This helps you find out how much stress you feel and what is causing it. When you know this,  "you can find better ways to cope.  What can you do to prevent stress?  You might try some of these things to help prevent stress:  Manage your time. This helps you find time to do the things you want and need to do.  Get enough sleep. Your body recovers from the stresses of the day while you are sleeping.  Get support. Your family, friends, and community can make a difference in how you experience stress.  Limit your news feed. Avoid or limit time on social media or news that may make you feel stressed.  Do something active. Exercise or activity can help reduce stress. Walking is a great way to get started.  Where can you learn more?  Go to https://www.yourdelivery.net/patiented  Enter N032 in the search box to learn more about \"Learning About Stress.\"  Current as of: October 24, 2024  Content Version: 14.5    1282-6909 [employer].   Care instructions adapted under license by your healthcare professional. If you have questions about a medical condition or this instruction, always ask your healthcare professional. [employer] disclaims any warranty or liability for your use of this information.       "